# Patient Record
Sex: FEMALE | Race: BLACK OR AFRICAN AMERICAN | Employment: FULL TIME | URBAN - METROPOLITAN AREA
[De-identification: names, ages, dates, MRNs, and addresses within clinical notes are randomized per-mention and may not be internally consistent; named-entity substitution may affect disease eponyms.]

---

## 2021-08-09 ENCOUNTER — APPOINTMENT (OUTPATIENT)
Dept: GENERAL RADIOLOGY | Age: 36
End: 2021-08-09
Payer: COMMERCIAL

## 2021-08-09 ENCOUNTER — APPOINTMENT (OUTPATIENT)
Dept: CT IMAGING | Age: 36
End: 2021-08-09
Payer: COMMERCIAL

## 2021-08-09 ENCOUNTER — HOSPITAL ENCOUNTER (EMERGENCY)
Age: 36
Discharge: HOME | End: 2021-08-09
Attending: EMERGENCY MEDICINE
Payer: COMMERCIAL

## 2021-08-09 VITALS
SYSTOLIC BLOOD PRESSURE: 113 MMHG | OXYGEN SATURATION: 100 % | HEART RATE: 81 BPM | RESPIRATION RATE: 23 BRPM | DIASTOLIC BLOOD PRESSURE: 83 MMHG | TEMPERATURE: 98.2 F

## 2021-08-09 DIAGNOSIS — R07.89 CHEST TIGHTNESS: Primary | ICD-10-CM

## 2021-08-09 DIAGNOSIS — T80.1XXA INTRAVENOUS INFILTRATION, INITIAL ENCOUNTER: ICD-10-CM

## 2021-08-09 LAB
ANION GAP SERPL CALCULATED.3IONS-SCNC: 13 MMOL/L (ref 3–16)
BASOPHILS ABSOLUTE: 0.1 K/UL (ref 0–0.2)
BASOPHILS RELATIVE PERCENT: 0.6 %
BUN BLDV-MCNC: 9 MG/DL (ref 7–20)
CALCIUM SERPL-MCNC: 9.7 MG/DL (ref 8.3–10.6)
CHLORIDE BLD-SCNC: 101 MMOL/L (ref 99–110)
CO2: 23 MMOL/L (ref 21–32)
CREAT SERPL-MCNC: 0.8 MG/DL (ref 0.6–1.1)
EOSINOPHILS ABSOLUTE: 0.4 K/UL (ref 0–0.6)
EOSINOPHILS RELATIVE PERCENT: 4.8 %
GFR AFRICAN AMERICAN: >60
GFR NON-AFRICAN AMERICAN: >60
GLUCOSE BLD-MCNC: 83 MG/DL (ref 70–99)
HCT VFR BLD CALC: 36.1 % (ref 36–48)
HEMOGLOBIN: 12.1 G/DL (ref 12–16)
LYMPHOCYTES ABSOLUTE: 2.9 K/UL (ref 1–5.1)
LYMPHOCYTES RELATIVE PERCENT: 34.7 %
MCH RBC QN AUTO: 25.7 PG (ref 26–34)
MCHC RBC AUTO-ENTMCNC: 33.5 G/DL (ref 31–36)
MCV RBC AUTO: 76.7 FL (ref 80–100)
MONOCYTES ABSOLUTE: 0.8 K/UL (ref 0–1.3)
MONOCYTES RELATIVE PERCENT: 9.5 %
NEUTROPHILS ABSOLUTE: 4.2 K/UL (ref 1.7–7.7)
NEUTROPHILS RELATIVE PERCENT: 50.4 %
PDW BLD-RTO: 16.1 % (ref 12.4–15.4)
PLATELET # BLD: 376 K/UL (ref 135–450)
PMV BLD AUTO: 8.3 FL (ref 5–10.5)
POTASSIUM REFLEX MAGNESIUM: 4.5 MMOL/L (ref 3.5–5.1)
PRO-BNP: 57 PG/ML (ref 0–124)
RBC # BLD: 4.71 M/UL (ref 4–5.2)
SODIUM BLD-SCNC: 137 MMOL/L (ref 136–145)
TROPONIN: <0.01 NG/ML
TROPONIN: <0.01 NG/ML
WBC # BLD: 8.3 K/UL (ref 4–11)

## 2021-08-09 PROCEDURE — 83880 ASSAY OF NATRIURETIC PEPTIDE: CPT

## 2021-08-09 PROCEDURE — 84484 ASSAY OF TROPONIN QUANT: CPT

## 2021-08-09 PROCEDURE — 71046 X-RAY EXAM CHEST 2 VIEWS: CPT

## 2021-08-09 PROCEDURE — 6370000000 HC RX 637 (ALT 250 FOR IP): Performed by: EMERGENCY MEDICINE

## 2021-08-09 PROCEDURE — 80048 BASIC METABOLIC PNL TOTAL CA: CPT

## 2021-08-09 PROCEDURE — 85025 COMPLETE CBC W/AUTO DIFF WBC: CPT

## 2021-08-09 PROCEDURE — 71275 CT ANGIOGRAPHY CHEST: CPT

## 2021-08-09 PROCEDURE — 99284 EMERGENCY DEPT VISIT MOD MDM: CPT

## 2021-08-09 PROCEDURE — 6360000004 HC RX CONTRAST MEDICATION: Performed by: EMERGENCY MEDICINE

## 2021-08-09 PROCEDURE — 36415 COLL VENOUS BLD VENIPUNCTURE: CPT

## 2021-08-09 PROCEDURE — 73110 X-RAY EXAM OF WRIST: CPT

## 2021-08-09 RX ORDER — ASPIRIN 325 MG
325 TABLET ORAL ONCE
Status: COMPLETED | OUTPATIENT
Start: 2021-08-09 | End: 2021-08-09

## 2021-08-09 RX ORDER — MORPHINE SULFATE 4 MG/ML
4 INJECTION, SOLUTION INTRAMUSCULAR; INTRAVENOUS
Status: DISCONTINUED | OUTPATIENT
Start: 2021-08-09 | End: 2021-08-09

## 2021-08-09 RX ORDER — VERAPAMIL HYDROCHLORIDE 120 MG/1
120 CAPSULE, EXTENDED RELEASE ORAL DAILY
COMMUNITY
Start: 2021-08-06 | End: 2021-12-01

## 2021-08-09 RX ADMIN — ASPIRIN 325 MG: 325 TABLET ORAL at 06:12

## 2021-08-09 RX ADMIN — ALUMINUM HYDROXIDE, MAGNESIUM HYDROXIDE, AND SIMETHICONE: 200; 200; 20 SUSPENSION ORAL at 06:12

## 2021-08-09 RX ADMIN — IOPAMIDOL 80 ML: 755 INJECTION, SOLUTION INTRAVENOUS at 08:27

## 2021-08-09 ASSESSMENT — PAIN DESCRIPTION - LOCATION
LOCATION: CHEST
LOCATION: CHEST;ARM

## 2021-08-09 ASSESSMENT — PAIN DESCRIPTION - PAIN TYPE
TYPE: ACUTE PAIN
TYPE: ACUTE PAIN

## 2021-08-09 ASSESSMENT — ENCOUNTER SYMPTOMS
WHEEZING: 0
SHORTNESS OF BREATH: 1
DIARRHEA: 0
COUGH: 0
NAUSEA: 0
EYE PAIN: 0
VOMITING: 0
ABDOMINAL PAIN: 0

## 2021-08-09 ASSESSMENT — PAIN SCALES - GENERAL
PAINLEVEL_OUTOF10: 7
PAINLEVEL_OUTOF10: 9

## 2021-08-09 ASSESSMENT — PAIN DESCRIPTION - FREQUENCY: FREQUENCY: CONTINUOUS

## 2021-08-09 ASSESSMENT — PAIN DESCRIPTION - ORIENTATION: ORIENTATION: LEFT

## 2021-08-09 NOTE — ED PROVIDER NOTES
[address] ENCOUNTER          ATTENDING PHYSICIAN NOTE       Date of evaluation: 8/9/2021    ADDENDUM:      Care of this patient was assumed from Dr. Disha Mcconnell. The patient was seen for Chest Pain (started at approximately 0430. Describes as burning with radiation to left arm.) and Anxiety (patient hyperventilating)  . The patient's initial evaluation and plan have been discussed with the prior provider who initially evaluated the patient. Nursing Notes, Past Medical Hx, Past Surgical Hx, Social Hx, Allergies, and Family Hx were all reviewed. Diagnostic Results       RADIOLOGY:  XR WRIST RIGHT (MIN 3 VIEWS)   Final Result      Large amount of extravasated IV contrast and soft tissues of the right hand and forearm      CTA CHEST ABDOMEN PELVIS W CONTRAST   Final Result      Chest CTA      1. Normal thoracic aorta   2. No pulmonary embolism   3. No acute cardiopulmonary disease      Abdomen pelvis CTA   1. Normal abdominal aorta     2. No acute abnormality in the abdomen or    3. Enlarged uterus containing multiple fibroids         XR CHEST (2 VW)   Final Result     Normal chest x-rays.               LABS:   Results for orders placed or performed during the hospital encounter of 08/09/21   CBC Auto Differential   Result Value Ref Range    WBC 8.3 4.0 - 11.0 K/uL    RBC 4.71 4.00 - 5.20 M/uL    Hemoglobin 12.1 12.0 - 16.0 g/dL    Hematocrit 36.1 36.0 - 48.0 %    MCV 76.7 (L) 80.0 - 100.0 fL    MCH 25.7 (L) 26.0 - 34.0 pg    MCHC 33.5 31.0 - 36.0 g/dL    RDW 16.1 (H) 12.4 - 15.4 %    Platelets 039 144 - 936 K/uL    MPV 8.3 5.0 - 10.5 fL    Neutrophils % 50.4 %    Lymphocytes % 34.7 %    Monocytes % 9.5 %    Eosinophils % 4.8 %    Basophils % 0.6 %    Neutrophils Absolute 4.2 1.7 - 7.7 K/uL    Lymphocytes Absolute 2.9 1.0 - 5.1 K/uL    Monocytes Absolute 0.8 0.0 - 1.3 K/uL    Eosinophils Absolute 0.4 0.0 - 0.6 K/uL    Basophils Absolute 0.1 0.0 - 0.2 K/uL   Basic Metabolic Panel w/ Reflex to MG   Result Value Ref Range    Sodium 137 136 - 145 mmol/L    Potassium reflex Magnesium 4.5 3.5 - 5.1 mmol/L    Chloride 101 99 - 110 mmol/L    CO2 23 21 - 32 mmol/L    Anion Gap 13 3 - 16    Glucose 83 70 - 99 mg/dL    BUN 9 7 - 20 mg/dL    CREATININE 0.8 0.6 - 1.1 mg/dL    GFR Non-African American >60 >60    GFR African American >60 >60    Calcium 9.7 8.3 - 10.6 mg/dL   Troponin   Result Value Ref Range    Troponin <0.01 <0.01 ng/mL   Brain Natriuretic Peptide   Result Value Ref Range    Pro-BNP 57 0 - 124 pg/mL   Troponin   Result Value Ref Range    Troponin <0.01 <0.01 ng/mL       RECENT VITALS:  BP: 136/66, Temp: 98.2 °F (36.8 °C), Pulse: 74, Resp: 14, SpO2: 100 %     Procedures       ED Course     The patient was given the following medications:  Orders Placed This Encounter   Medications    DISCONTD: morphine injection 4 mg    aspirin tablet 325 mg    aluminum & magnesium hydroxide-simethicone (MAALOX) 30 mL, lidocaine viscous hcl (XYLOCAINE) 5 mL (GI COCKTAIL)    iopamidol (ISOVUE-370) 76 % injection 80 mL       CONSULTS:  None    MEDICAL DECISION MAKING / ASSESSMENT / Dolph Calvin is a 38 y.o. female who presented to the emergency department in the early morning hours last night with complaints of burning chest pain, with a heavy sensation in the arms, and apparent symmetric bilateral weakness in the upper extremities, with normal strength in the lower extremities. Please see Dr. Gloria Banda initial dictation for details the patient's history, physical examination, and initial emergency department course. The patient's care was given over to me at 0700 hrs. with results of repeat troponin as well as CT aortogram pending. Repeat troponin was also negative. CT aortogram showed no abnormalities of the aorta or large vasculature, and also no abnormalities of the intrathoracic and intra-abdominal contents otherwise.     On reexamination, the patient endorsed improvement to near resolution of her chest tightness, as well as of her bilateral arm weakness, which she stated was no longer present. However, patient was found to have had contrast extravasation into the right wrist, forearm, and hand. She had significant subcutaneous edema, but good range of motion of the hand, normal distal sensation and capillary refill, no signs of compartment syndrome. X-ray of the affected area of the limb was obtained, which did show that the contrast extravasation was into the subcutaneous tissue, and not into a muscular compartment. The patient was given warm compresses and asked to elevate the arm as much as possible. On final reexamination, the subcutaneous edema had extended somewhat further up the forearm, but compartments were still soft, hand still neurovascularly intact distally. I discussed with the patient that we would often observe for at least several hours in the emergency department with warm compresses and elevation to watch for signs or symptoms of compartment syndrome, and would even potentially consider admission for observation. The patient declined admission, however, and felt that she was capable of observing for worsening symptoms. The patient's family member present at the bedside, is a nurse practitioner, and also feels comfortable participating in observation for signs or symptoms of compartment syndrome. These were discussed extensively with the patient and her family member, and they are comfortable with discharge for continued self observation at home. Clinical Impression     1. Chest tightness    2.  Intravenous infiltration, initial encounter        Disposition     PATIENT REFERRED TO:  [facility] Emergency Department  [address] [place] [address]  Go to   If symptoms worsen      DISCHARGE MEDICATIONS:  New Prescriptions    No medications on file       DISPOSITION Decision To Discharge 08/09/2021 11:46:08 AM       Shawn Cordon MD  08/09/21 1200

## 2021-08-09 NOTE — LETTER
[facility] Emergency Department  [place] [id number]  Phone: [phone]  Fax: [phone]               August 10, 2021    Patient: Casa Bragg   YOB: 1985   Date of Visit: 8/9/2021       To Whom It May Concern:    Casa Bragg was seen and treated in our emergency department on 8/9/2021. She may return to work on 08/11/2021.       Sincerely,             Signature:__________________________________

## 2021-08-09 NOTE — ED PROVIDER NOTES
[address] [facility]          ATTENDING PHYSICIAN NOTE       Date of evaluation: 8/9/2021    Chief Complaint     Chest Pain (started at approximately 0430. Describes as burning with radiation to left arm.) and Anxiety (patient hyperventilating)      History of Present Illness     Shawna Tena is a 38 y.o. female who presents with chief complaint of chest pain. The patient states she woke around 4:30 AM with pain that is located on the center of her chest.  She states that it is squeezing in nature and radiating to her left shoulder. States that both of her arms feel very weak and heavy. They also feel as if they are aching. Her pain is 9/10 in severity. No exacerbating or alleviating factors. Associated with mild dyspnea. Felt fine when she went to bed last night. No cough. No lower extremity swelling. No headaches. No lower extremity numbness or weakness. No bowel or bladder incontinence. No abdominal pain, nausea or vomiting. No recent surgery or travel. Review of Systems     Review of Systems   Constitutional: Negative for chills and fever. HENT: Negative for congestion. Eyes: Negative for pain. Respiratory: Positive for shortness of breath. Negative for cough and wheezing. Cardiovascular: Positive for chest pain. Negative for leg swelling. Gastrointestinal: Negative for abdominal pain, diarrhea, nausea and vomiting. Genitourinary: Negative for dysuria. Musculoskeletal: Negative for arthralgias. Skin: Negative for rash. Neurological: Positive for weakness. Negative for headaches. All other systems reviewed and are negative. Past Medical, Surgical, Family, and Social History         Diagnosis Date    Headache      History reviewed. No pertinent surgical history. Her family history is not on file. She reports that she has never smoked. She has never used smokeless tobacco. She reports previous alcohol use.  She reports that she does not use drugs. Medications     Previous Medications    VERAPAMIL (VERELAN) 120 MG EXTENDED RELEASE CAPSULE           Allergies     She has No Known Allergies. Physical Exam     ED Triage Vitals   Enc Vitals Group      BP 08/09/21 0515 (!) 137/92      Pulse 08/09/21 0515 91      Resp 08/09/21 0515 14      Temp 08/09/21 0543 98.2 °F (36.8 °C)      Temp Source 08/09/21 0543 Oral      SpO2 08/09/21 0515 92 %      Weight --       Height --       Head Circumference --       Peak Flow --       Pain Score --       Pain Loc --       Pain Edu? --       Excl. in [address]? --      General:  Non-toxic, tearful, fully cooperative with my exam    HEENT:  NCAT    Neck:  Supple    Pulmonary:   No increased work of breathing; CTAB    Cardiac:  RRR, no murmur, thrill or gallop. Capillary refill <3s. 2+ distal pulses    Abdomen:  Soft, nontender, nondistended; no focal rebound or guarding    Musculoskeletal:  Grossly intact without obvious injury or deformity. There is some slight tenderness to palpation with spasm over the left trapezius muscle    Neuro: Patient is awake alert and oriented x4, her cranial nerves are intact, she exhibits 3/5 strength of the bilateral upper extremities which is symmetric. 5/5 strength of bilateral lower extremities. Sensation is intact to light touch diffusely. Skin: No rashes      Diagnostic Results     EKG   NSR. No acute ischemic changes. RADIOLOGY:  XR CHEST (2 VW)   Final Result     Normal chest x-rays.           CTA CHEST ABDOMEN PELVIS W CONTRAST    (Results Pending)       LABS:   Results for orders placed or performed during the hospital encounter of 08/09/21   CBC Auto Differential   Result Value Ref Range    WBC 8.3 4.0 - 11.0 K/uL    RBC 4.71 4.00 - 5.20 M/uL    Hemoglobin 12.1 12.0 - 16.0 g/dL    Hematocrit 36.1 36.0 - 48.0 %    MCV 76.7 (L) 80.0 - 100.0 fL    MCH 25.7 (L) 26.0 - 34.0 pg    MCHC 33.5 31.0 - 36.0 g/dL    RDW 16.1 (H) 12.4 - 15.4 %    Platelets 396 310 - 817 K/uL    MPV 8.3 5.0 - 10.5 fL    Neutrophils % 50.4 %    Lymphocytes % 34.7 %    Monocytes % 9.5 %    Eosinophils % 4.8 %    Basophils % 0.6 %    Neutrophils Absolute 4.2 1.7 - 7.7 K/uL    Lymphocytes Absolute 2.9 1.0 - 5.1 K/uL    Monocytes Absolute 0.8 0.0 - 1.3 K/uL    Eosinophils Absolute 0.4 0.0 - 0.6 K/uL    Basophils Absolute 0.1 0.0 - 0.2 K/uL   Basic Metabolic Panel w/ Reflex to MG   Result Value Ref Range    Sodium 137 136 - 145 mmol/L    Potassium reflex Magnesium 4.5 3.5 - 5.1 mmol/L    Chloride 101 99 - 110 mmol/L    CO2 23 21 - 32 mmol/L    Anion Gap 13 3 - 16    Glucose 83 70 - 99 mg/dL    BUN 9 7 - 20 mg/dL    CREATININE 0.8 0.6 - 1.1 mg/dL    GFR Non-African American >60 >60    GFR African American >60 >60    Calcium 9.7 8.3 - 10.6 mg/dL   Troponin   Result Value Ref Range    Troponin <0.01 <0.01 ng/mL   Brain Natriuretic Peptide   Result Value Ref Range    Pro-BNP 57 0 - 124 pg/mL       RECENT VITALS:  BP: 123/64, Temp: 98.2 °F (36.8 °C), Pulse: 83, Resp: 18, SpO2: 100 %     Procedures         ED Course     Nursing Notes, Past Medical Hx, Past Surgical Hx, Social Hx, Allergies, and Family Hx were reviewed. The patient was given the following medications:  Orders Placed This Encounter   Medications    DISCONTD: morphine injection 4 mg    aspirin tablet 325 mg    aluminum & magnesium hydroxide-simethicone (MAALOX) 30 mL, lidocaine viscous hcl (XYLOCAINE) 5 mL (GI COCKTAIL)       CONSULTS:  None    MEDICAL DECISION MAKING / ASSESSMENT / Ai Mirella is a 38 y.o. female who presents with chest pain and arm weakness. Her vascular exam is reassuring against acute occlusion or ischemia. EKG without signs of acute ischemic change. Initial troponin is not elevated. Does have some concerning features of her chest pain but declined morphine. Is given a GI cocktail. Difficult to put together chest pain plus the arm weakness.   Will get a CT of the aorta to ensure there is not a vascular source as this is the most reasonable way to connect these 2. Lower suspicion for April Me variant's of Guillain-Barré as that would not explain the chest pain despite the fact that it would explain the arm weakness. Symmetric arm weakness in bilateral upper extremities would suggest against a central cause and certainly would not be suggestive of acute ischemic stroke. Does not have midline neck pain to think of a protruding cervical disc and with the symptoms being isolated the upper extremities lower suspicion for spinal compression. Patient is a turned over to Dr. Adiel Sabillon to follow-up on CT scan and reevaluate patient at that point. Clinical Impression     Chest Pain    Disposition     PATIENT REFERRED TO:  No follow-up provider specified.     DISCHARGE MEDICATIONS:  New Prescriptions    No medications on file       [address], MD  08/09/21 6717

## 2021-08-10 ENCOUNTER — HOSPITAL ENCOUNTER (EMERGENCY)
Age: 36
Discharge: HOME | End: 2021-08-10
Attending: EMERGENCY MEDICINE
Payer: COMMERCIAL

## 2021-08-10 ENCOUNTER — APPOINTMENT (OUTPATIENT)
Dept: GENERAL RADIOLOGY | Age: 36
End: 2021-08-10
Payer: COMMERCIAL

## 2021-08-10 VITALS
OXYGEN SATURATION: 100 % | DIASTOLIC BLOOD PRESSURE: 80 MMHG | SYSTOLIC BLOOD PRESSURE: 128 MMHG | TEMPERATURE: 98.6 F | RESPIRATION RATE: 16 BRPM | HEART RATE: 82 BPM

## 2021-08-10 DIAGNOSIS — M79.89 SWELLING OF RIGHT UPPER EXTREMITY: Primary | ICD-10-CM

## 2021-08-10 LAB
ANION GAP SERPL CALCULATED.3IONS-SCNC: 10 MMOL/L (ref 3–16)
BASOPHILS ABSOLUTE: 0.1 K/UL (ref 0–0.2)
BASOPHILS RELATIVE PERCENT: 0.8 %
BUN BLDV-MCNC: 10 MG/DL (ref 7–20)
CALCIUM SERPL-MCNC: 9.5 MG/DL (ref 8.3–10.6)
CHLORIDE BLD-SCNC: 97 MMOL/L (ref 99–110)
CO2: 28 MMOL/L (ref 21–32)
CREAT SERPL-MCNC: 0.9 MG/DL (ref 0.6–1.1)
EOSINOPHILS ABSOLUTE: 0.4 K/UL (ref 0–0.6)
EOSINOPHILS RELATIVE PERCENT: 4.9 %
GFR AFRICAN AMERICAN: >60
GFR NON-AFRICAN AMERICAN: >60
GLUCOSE BLD-MCNC: 97 MG/DL (ref 70–99)
HCT VFR BLD CALC: 34 % (ref 36–48)
HEMOGLOBIN: 11.5 G/DL (ref 12–16)
LYMPHOCYTES ABSOLUTE: 2.4 K/UL (ref 1–5.1)
LYMPHOCYTES RELATIVE PERCENT: 28.4 %
MCH RBC QN AUTO: 26.1 PG (ref 26–34)
MCHC RBC AUTO-ENTMCNC: 33.8 G/DL (ref 31–36)
MCV RBC AUTO: 77.3 FL (ref 80–100)
MONOCYTES ABSOLUTE: 0.7 K/UL (ref 0–1.3)
MONOCYTES RELATIVE PERCENT: 8.5 %
NEUTROPHILS ABSOLUTE: 4.8 K/UL (ref 1.7–7.7)
NEUTROPHILS RELATIVE PERCENT: 57.4 %
PDW BLD-RTO: 15.8 % (ref 12.4–15.4)
PLATELET # BLD: 330 K/UL (ref 135–450)
PMV BLD AUTO: 8 FL (ref 5–10.5)
POTASSIUM REFLEX MAGNESIUM: 3.6 MMOL/L (ref 3.5–5.1)
RBC # BLD: 4.4 M/UL (ref 4–5.2)
SODIUM BLD-SCNC: 135 MMOL/L (ref 136–145)
WBC # BLD: 8.4 K/UL (ref 4–11)

## 2021-08-10 PROCEDURE — 36415 COLL VENOUS BLD VENIPUNCTURE: CPT

## 2021-08-10 PROCEDURE — 85025 COMPLETE CBC W/AUTO DIFF WBC: CPT

## 2021-08-10 PROCEDURE — 73090 X-RAY EXAM OF FOREARM: CPT

## 2021-08-10 PROCEDURE — 80048 BASIC METABOLIC PNL TOTAL CA: CPT

## 2021-08-10 PROCEDURE — 99283 EMERGENCY DEPT VISIT LOW MDM: CPT

## 2021-08-10 RX ORDER — DOXYCYCLINE 100 MG/1
100 TABLET ORAL 2 TIMES DAILY
Qty: 20 TABLET | Refills: 0 | Status: SHIPPED | OUTPATIENT
Start: 2021-08-10 | End: 2021-08-16

## 2021-08-10 ASSESSMENT — PAIN DESCRIPTION - LOCATION: LOCATION: HAND

## 2021-08-10 ASSESSMENT — PAIN DESCRIPTION - ORIENTATION: ORIENTATION: RIGHT

## 2021-08-10 ASSESSMENT — PAIN SCALES - GENERAL: PAINLEVEL_OUTOF10: 5

## 2021-08-10 ASSESSMENT — PAIN DESCRIPTION - PAIN TYPE: TYPE: ACUTE PAIN

## 2021-08-11 ASSESSMENT — ENCOUNTER SYMPTOMS: RESPIRATORY NEGATIVE: 1

## 2021-08-11 NOTE — ED PROVIDER NOTES
ED Attending Attestation Note     Date of evaluation: 8/10/2021    This patient was seen by the [place] practice provider. I have seen and examined the patient, agree with the workup, evaluation, management and diagnosis. The care plan has been discussed. My assessment reveals a well-appearing young woman, pleasantly conversational, in no acute distress. She was discharged from this emergency department about 36 hours ago, after a work-up for chest pain, which was complicated by CT contrast extravasation into the right forearm. At the time of discharge, she had soft compartments, although diffuse edema of the hand, wrist, and forearm, with no signs or symptoms of compartment syndrome, and wished to elevate and monitor at home. The patient presents that the edema in her hand and forearm was much better when she woke up this morning after elevating overnight, but in the last several hours it has begun to worsen again. On exam, she does have some nonpitting edema diffusely throughout the hand, wrist, and forearm, essentially to the elbow. This is improved from her exam yesterday morning, and with less induration. She has good range of motion, normal capillary refill, normal sensation and motor function. I discussed with the patient that the most likely explanation for the progression of her symptoms is that the edema had improved with elevation, and then began to reaccumulate as she was using her arm and gravity acted upon it over the course of the day. However I discussed with her that there is a very faint possibility that her symptoms could be relating to an early, developing cellulitis associated with the infiltration. I discussed with the patient that she should elevate again overnight, and if the edema is again improved in the morning, then infection is not the source of her symptoms.   However if she does not note improvement in the morning, or if she should have worsening, then she will have a prescription for antibiotics which she should fill in that case, so that she would not need to return to the emergency department at that time. The patient and her family member, present at bedside, were comfortable with this plan.          Tegan Ann MD  08/10/21 9490

## 2021-08-16 ENCOUNTER — OFFICE VISIT (OUTPATIENT)
Dept: INTERNAL MEDICINE CLINIC | Age: 36
End: 2021-08-16
Payer: COMMERCIAL

## 2021-08-16 VITALS
WEIGHT: 183 LBS | RESPIRATION RATE: 16 BRPM | OXYGEN SATURATION: 99 % | HEART RATE: 94 BPM | DIASTOLIC BLOOD PRESSURE: 76 MMHG | SYSTOLIC BLOOD PRESSURE: 132 MMHG

## 2021-08-16 DIAGNOSIS — Q45.3 PANCREATIC DIVISUM: ICD-10-CM

## 2021-08-16 DIAGNOSIS — R00.2 PALPITATIONS: ICD-10-CM

## 2021-08-16 DIAGNOSIS — R07.9 CHEST PAIN, UNSPECIFIED TYPE: Primary | ICD-10-CM

## 2021-08-16 DIAGNOSIS — Z00.00 ENCOUNTER FOR PREVENTATIVE ADULT HEALTH CARE EXAMINATION: ICD-10-CM

## 2021-08-16 DIAGNOSIS — G43.409 HEMIPLEGIC MIGRAINE WITHOUT STATUS MIGRAINOSUS, NOT INTRACTABLE: ICD-10-CM

## 2021-08-16 DIAGNOSIS — Z11.59 ENCOUNTER FOR HEPATITIS C SCREENING TEST FOR LOW RISK PATIENT: ICD-10-CM

## 2021-08-16 DIAGNOSIS — Z12.31 ENCOUNTER FOR SCREENING MAMMOGRAM FOR BREAST CANCER: ICD-10-CM

## 2021-08-16 LAB
BILIRUBIN URINE: NEGATIVE
BLOOD, URINE: NEGATIVE
CLARITY: CLEAR
COLOR: YELLOW
EPITHELIAL CELLS, UA: 3 /HPF (ref 0–5)
GLUCOSE URINE: NEGATIVE MG/DL
HYALINE CASTS: 0 /LPF (ref 0–8)
KETONES, URINE: NEGATIVE MG/DL
LEUKOCYTE ESTERASE, URINE: ABNORMAL
MICROSCOPIC EXAMINATION: YES
NITRITE, URINE: NEGATIVE
PH UA: 7 (ref 5–8)
PROTEIN UA: NEGATIVE MG/DL
RBC UA: 1 /HPF (ref 0–4)
SPECIFIC GRAVITY UA: 1.01 (ref 1–1.03)
URINE TYPE: ABNORMAL
UROBILINOGEN, URINE: 0.2 E.U./DL
WBC UA: 3 /HPF (ref 0–5)

## 2021-08-16 PROCEDURE — 99385 PREV VISIT NEW AGE 18-39: CPT | Performed by: INTERNAL MEDICINE

## 2021-08-16 PROCEDURE — 81003 URINALYSIS AUTO W/O SCOPE: CPT | Performed by: INTERNAL MEDICINE

## 2021-08-16 RX ORDER — DICYCLOMINE HCL 20 MG
TABLET ORAL
COMMUNITY
End: 2021-10-13 | Stop reason: CLARIF

## 2021-08-16 RX ORDER — TRIAMTERENE AND HYDROCHLOROTHIAZIDE 37.5; 25 MG/1; MG/1
TABLET ORAL
COMMUNITY
End: 2021-09-04

## 2021-08-16 SDOH — ECONOMIC STABILITY: FOOD INSECURITY: WITHIN THE PAST 12 MONTHS, THE FOOD YOU BOUGHT JUST DIDN'T LAST AND YOU DIDN'T HAVE MONEY TO GET MORE.: NEVER TRUE

## 2021-08-16 SDOH — ECONOMIC STABILITY: FOOD INSECURITY: WITHIN THE PAST 12 MONTHS, YOU WORRIED THAT YOUR FOOD WOULD RUN OUT BEFORE YOU GOT MONEY TO BUY MORE.: NEVER TRUE

## 2021-08-16 ASSESSMENT — ENCOUNTER SYMPTOMS
ABDOMINAL PAIN: 0
TROUBLE SWALLOWING: 0
BLOOD IN STOOL: 0
CONSTIPATION: 0
DIARRHEA: 0
FACIAL SWELLING: 0
SORE THROAT: 0
EYES NEGATIVE: 1
COUGH: 0
ALLERGIC/IMMUNOLOGIC NEGATIVE: 1
SINUS PAIN: 0
SINUS PRESSURE: 0
ANAL BLEEDING: 0
VOICE CHANGE: 0
NAUSEA: 0
VOMITING: 0
ABDOMINAL DISTENTION: 0
SHORTNESS OF BREATH: 0

## 2021-08-16 ASSESSMENT — PATIENT HEALTH QUESTIONNAIRE - PHQ9
SUM OF ALL RESPONSES TO PHQ QUESTIONS 1-9: 0
SUM OF ALL RESPONSES TO PHQ9 QUESTIONS 1 & 2: 0
2. FEELING DOWN, DEPRESSED OR HOPELESS: 0
2. FEELING DOWN, DEPRESSED OR HOPELESS: 0
SUM OF ALL RESPONSES TO PHQ QUESTIONS 1-9: 0
SUM OF ALL RESPONSES TO PHQ9 QUESTIONS 1 & 2: 0
SUM OF ALL RESPONSES TO PHQ QUESTIONS 1-9: 0
1. LITTLE INTEREST OR PLEASURE IN DOING THINGS: 0
SUM OF ALL RESPONSES TO PHQ QUESTIONS 1-9: 0
1. LITTLE INTEREST OR PLEASURE IN DOING THINGS: 0

## 2021-08-16 ASSESSMENT — SOCIAL DETERMINANTS OF HEALTH (SDOH): HOW HARD IS IT FOR YOU TO PAY FOR THE VERY BASICS LIKE FOOD, HOUSING, MEDICAL CARE, AND HEATING?: NOT HARD AT ALL

## 2021-08-16 NOTE — ASSESSMENT & PLAN NOTE
Uncontrolled, continue current medications, lifestyle modifications recommended and Will do cholesterol screening. Recent emergency room visit showed normal CTA of the chest and normal troponins. With patient's history of hemiplegic migraine concern for vasospasm which to verapamil would partially treat. We will get a stress test to further evaluate and evaluate lipid profile for risk assessment. No myalgias or arthralgias to indicate lupus but possibility of a rheumatologic disease linking the migraines and chest pain.

## 2021-08-16 NOTE — PROGRESS NOTES
Sudha Ortiz (:  1985) is a 38 y.o. female,New patient, here for evaluation of the following chief complaint(s): For the chest pain and a preventive evaluation  New Patient (new to office)         ASSESSMENT/PLAN:  1. Chest pain, unspecified type  Assessment & Plan:   Uncontrolled, continue current medications, lifestyle modifications recommended and Will do cholesterol screening. Recent emergency room visit showed normal CTA of the chest and normal troponins. With patient's history of hemiplegic migraine concern for vasospasm which to verapamil would partially treat. We will get a stress test to further evaluate and evaluate lipid profile for risk assessment. No myalgias or arthralgias to indicate lupus but possibility of a rheumatologic disease linking the migraines and chest pain. Orders:  -     NM MYOCARDIAL SPECT REST EXERCISE OR RX; Future  -     LONGTERM CONTINUOUS CARDIAC EVENT MONITOR (ZIO); Future  2. Hemiplegic migraine without status migrainosus, not intractable  Assessment & Plan:   Well-controlled, continue current medications  3. Encounter for screening mammogram for breast cancer  Assessment & Plan:   Unclear control, Inpatient but she is low risk. Orders:  -     RAFY HAILEE DIGITAL SCREEN BILATERAL; Future  4. Encounter for preventative adult health care examination  Assessment & Plan:   Unclear control, Inpatient but she is low risk. Orders:  -     Urinalysis  -     CBC WITH AUTO DIFFERENTIAL; Future  -     COMPREHENSIVE METABOLIC PANEL; Future  -     HEMOGLOBIN A1C; Future  -     LIPID PANEL; Future  -     TSH WITH REFLEX TO FT4; Future  -     VITAMIN D 25 HYDROXY; Future  5. Pancreatic divisum  Assessment & Plan:   Well-controlled, Since gallbladder removal and dilatation of the pancreatic ducts has been under control since around . We will get a lipase level and if elevated will refer for GI consultation. Orders:  -     LIPASE; Future  6.  Encounter for hepatitis C screening test for low risk patient  Assessment & Plan:   Unclear control, Inpatient but she is low risk. Orders:  -     HEPATITIS C ANTIBODY; Future  7. Palpitations  Assessment & Plan:   Uncontrolled, continue current medications and We will have patient get a ZIO monitor since palpitations happen daily and check CBC and thyroid function to further evaluate. We will have patient keep a ZIO monitor for 2 weeks. Orders:  -     LONGTERM CONTINUOUS CARDIAC EVENT MONITOR (ZIO); Future      Return in about 4 weeks (around 9/13/2021). Subjective   SUBJECTIVE/OBJECTIVE:  Chest Pain   This is a new problem. The current episode started more than 1 year ago. The onset quality is sudden (awakens from sleep). The problem occurs intermittently. The problem has been waxing and waning. The pain is present in the substernal region. The pain is at a severity of 10/10. The pain is severe. The quality of the pain is described as sharp. The pain radiates to the left shoulder, right shoulder, left neck, right arm, left arm and upper back. Associated symptoms include irregular heartbeat and palpitations. Pertinent negatives include no abdominal pain, claudication, cough, diaphoresis, nausea, shortness of breath or vomiting. She has tried nothing for the symptoms. The treatment provided significant relief. Pertinent negatives for past medical history include no congenital heart disease, no connective tissue disease and no hypertension. Impression       Chest CTA       1. Normal thoracic aorta   2. No pulmonary embolism   3. No acute cardiopulmonary disease       Abdomen pelvis CTA   1. Normal abdominal aorta     2. No acute abnormality in the abdomen or    3.  Enlarged uterus conta     Family History   Problem Relation Age of Onset    Hypertension Mother     Migraines Mother    [facility] Stroke Father     Hypertension Father     Diabetes Father     Other Sister         pancreatitis     Social History     Social History Narrative    Not on file   Patient does not smoke or drink and is single. Current Outpatient Medications on File Prior to Visit   Medication Sig Dispense Refill    triamterene-hydroCHLOROthiazide (MAXZIDE-25) 37.5-25 MG per tablet triamterene 37.5 mg-hydrochlorothiazide 25 mg tablet   TAKE 1 TABLET BY MOUTH EVERY DAY      dicyclomine (BENTYL) 20 MG tablet dicyclomine 20 mg tablet   TAKE 1 TABLETS BY MOUTH 4 TIMES A DAY AS NEEDED FOR IRRITABLE BOWEL SYNDROME      verapamil (VERELAN) 120 MG extended release capsule        No current facility-administered medications on file prior to visit. Review of Systems   Constitutional: Negative. Negative for diaphoresis. HENT: Positive for hearing loss. Negative for congestion, dental problem, drooling, ear discharge, ear pain, facial swelling, nosebleeds, sinus pressure, sinus pain, sore throat, trouble swallowing and voice change. Hearing loss right ear. Eyes: Negative. Eye exam 1.5 years ago. Respiratory: Negative for cough and shortness of breath. Cardiovascular: Positive for chest pain and palpitations. Negative for claudication. Gastrointestinal: Negative for abdominal distention, abdominal pain, anal bleeding, blood in stool, constipation, diarrhea, nausea and vomiting. Pancreatic divisum  Past bout of severe pancreatitis, prior to gall bladder removal.   Endocrine: Negative. Negative for cold intolerance, heat intolerance, polydipsia, polyphagia and polyuria. Genitourinary:        Maternal great aunt with breast cancer. Menarche age 13.   Uterine fibroid. Has pap appointment 21   Musculoskeletal: Negative. Allergic/Immunologic: Negative. Neurological:        Hemiplegic migraine   Hematological: Negative. Psychiatric/Behavioral: Negative. Objective   Physical Exam  Constitutional:       General: She is not in acute distress. Appearance: Normal appearance. She is normal weight.    HENT:      Head:

## 2021-08-16 NOTE — LETTER
[place] [address]  Phone: [phone]  Fax: [phone]    Osbaldo Macario MD        August 16, 2021     Patient: Abdiaziz Somers   YOB: 1985   Date of Visit: 8/16/2021       To Whom It May Concern:     Abdiaziz Somers was evaluated in the office today. .    If you have any questions or concerns, please don't hesitate to call.     Sincerely,          Osbaldo Macario MD

## 2021-08-18 ENCOUNTER — HOSPITAL ENCOUNTER (EMERGENCY)
Age: 36
Discharge: HOME | End: 2021-08-18
Attending: EMERGENCY MEDICINE
Payer: COMMERCIAL

## 2021-08-18 ENCOUNTER — APPOINTMENT (OUTPATIENT)
Dept: GENERAL RADIOLOGY | Age: 36
End: 2021-08-18
Payer: COMMERCIAL

## 2021-08-18 ENCOUNTER — NURSE TRIAGE (OUTPATIENT)
Dept: OTHER | Facility: CLINIC | Age: 36
End: 2021-08-18

## 2021-08-18 VITALS
WEIGHT: 180 LBS | DIASTOLIC BLOOD PRESSURE: 75 MMHG | SYSTOLIC BLOOD PRESSURE: 112 MMHG | OXYGEN SATURATION: 100 % | RESPIRATION RATE: 16 BRPM | HEIGHT: 64 IN | BODY MASS INDEX: 30.73 KG/M2 | HEART RATE: 93 BPM | TEMPERATURE: 97.3 F

## 2021-08-18 DIAGNOSIS — R07.89 CHEST TIGHTNESS: Primary | ICD-10-CM

## 2021-08-18 DIAGNOSIS — R06.02 SHORTNESS OF BREATH: Primary | ICD-10-CM

## 2021-08-18 LAB
ALBUMIN SERPL-MCNC: 4 G/DL (ref 3.4–5)
ALP BLD-CCNC: 72 U/L (ref 40–129)
ALT SERPL-CCNC: 12 U/L (ref 10–40)
ANION GAP SERPL CALCULATED.3IONS-SCNC: 13 MMOL/L (ref 3–16)
AST SERPL-CCNC: 20 U/L (ref 15–37)
BASOPHILS ABSOLUTE: 0 K/UL (ref 0–0.2)
BASOPHILS RELATIVE PERCENT: 0.6 %
BILIRUB SERPL-MCNC: <0.2 MG/DL (ref 0–1)
BILIRUBIN DIRECT: <0.2 MG/DL (ref 0–0.3)
BILIRUBIN, INDIRECT: NORMAL MG/DL (ref 0–1)
BUN BLDV-MCNC: 10 MG/DL (ref 7–20)
CALCIUM SERPL-MCNC: 9.6 MG/DL (ref 8.3–10.6)
CHLORIDE BLD-SCNC: 99 MMOL/L (ref 99–110)
CO2: 23 MMOL/L (ref 21–32)
CREAT SERPL-MCNC: 0.7 MG/DL (ref 0.6–1.1)
EKG ATRIAL RATE: 91 BPM
EKG DIAGNOSIS: NORMAL
EKG P AXIS: 39 DEGREES
EKG P-R INTERVAL: 148 MS
EKG Q-T INTERVAL: 350 MS
EKG QRS DURATION: 76 MS
EKG QTC CALCULATION (BAZETT): 430 MS
EKG R AXIS: 18 DEGREES
EKG T AXIS: 17 DEGREES
EKG VENTRICULAR RATE: 91 BPM
EOSINOPHILS ABSOLUTE: 0.4 K/UL (ref 0–0.6)
EOSINOPHILS RELATIVE PERCENT: 5.1 %
GFR AFRICAN AMERICAN: >60
GFR NON-AFRICAN AMERICAN: >60
GLUCOSE BLD-MCNC: 105 MG/DL (ref 70–99)
HCT VFR BLD CALC: 33.9 % (ref 36–48)
HEMOGLOBIN: 11.3 G/DL (ref 12–16)
LYMPHOCYTES ABSOLUTE: 1.9 K/UL (ref 1–5.1)
LYMPHOCYTES RELATIVE PERCENT: 26.4 %
MCH RBC QN AUTO: 25.3 PG (ref 26–34)
MCHC RBC AUTO-ENTMCNC: 33.4 G/DL (ref 31–36)
MCV RBC AUTO: 75.8 FL (ref 80–100)
MONOCYTES ABSOLUTE: 0.7 K/UL (ref 0–1.3)
MONOCYTES RELATIVE PERCENT: 9.4 %
NEUTROPHILS ABSOLUTE: 4.3 K/UL (ref 1.7–7.7)
NEUTROPHILS RELATIVE PERCENT: 58.5 %
PDW BLD-RTO: 16.2 % (ref 12.4–15.4)
PLATELET # BLD: 336 K/UL (ref 135–450)
PMV BLD AUTO: 8.3 FL (ref 5–10.5)
POTASSIUM REFLEX MAGNESIUM: 3.7 MMOL/L (ref 3.5–5.1)
RBC # BLD: 4.48 M/UL (ref 4–5.2)
SEDIMENTATION RATE, ERYTHROCYTE: 40 MM/HR (ref 0–20)
SODIUM BLD-SCNC: 135 MMOL/L (ref 136–145)
TOTAL PROTEIN: 7.4 G/DL (ref 6.4–8.2)
TROPONIN: <0.01 NG/ML
TROPONIN: <0.01 NG/ML
WBC # BLD: 7.3 K/UL (ref 4–11)

## 2021-08-18 PROCEDURE — 85025 COMPLETE CBC W/AUTO DIFF WBC: CPT

## 2021-08-18 PROCEDURE — 85652 RBC SED RATE AUTOMATED: CPT

## 2021-08-18 PROCEDURE — 93005 ELECTROCARDIOGRAM TRACING: CPT | Performed by: EMERGENCY MEDICINE

## 2021-08-18 PROCEDURE — 84484 ASSAY OF TROPONIN QUANT: CPT

## 2021-08-18 PROCEDURE — 80076 HEPATIC FUNCTION PANEL: CPT

## 2021-08-18 PROCEDURE — 71045 X-RAY EXAM CHEST 1 VIEW: CPT

## 2021-08-18 PROCEDURE — 94640 AIRWAY INHALATION TREATMENT: CPT

## 2021-08-18 PROCEDURE — 36415 COLL VENOUS BLD VENIPUNCTURE: CPT

## 2021-08-18 PROCEDURE — 80048 BASIC METABOLIC PNL TOTAL CA: CPT

## 2021-08-18 PROCEDURE — 99284 EMERGENCY DEPT VISIT MOD MDM: CPT

## 2021-08-18 PROCEDURE — 6370000000 HC RX 637 (ALT 250 FOR IP): Performed by: PHYSICIAN ASSISTANT

## 2021-08-18 RX ORDER — ALBUTEROL SULFATE 90 UG/1
2 AEROSOL, METERED RESPIRATORY (INHALATION) EVERY 4 HOURS PRN
Qty: 1 INHALER | Refills: 0 | Status: SHIPPED | OUTPATIENT
Start: 2021-08-18 | End: 2021-08-30 | Stop reason: SDUPTHER

## 2021-08-18 RX ORDER — IPRATROPIUM BROMIDE AND ALBUTEROL SULFATE 2.5; .5 MG/3ML; MG/3ML
1 SOLUTION RESPIRATORY (INHALATION) ONCE
Status: COMPLETED | OUTPATIENT
Start: 2021-08-18 | End: 2021-08-18

## 2021-08-18 RX ADMIN — IPRATROPIUM BROMIDE AND ALBUTEROL SULFATE 1 AMPULE: .5; 3 SOLUTION RESPIRATORY (INHALATION) at 12:47

## 2021-08-18 ASSESSMENT — ENCOUNTER SYMPTOMS
WHEEZING: 0
ABDOMINAL PAIN: 0
SHORTNESS OF BREATH: 1
SORE THROAT: 0
NAUSEA: 0
DIARRHEA: 0
CHEST TIGHTNESS: 1
VOMITING: 0
COUGH: 0
CONSTIPATION: 0
RHINORRHEA: 0

## 2021-08-18 ASSESSMENT — PAIN DESCRIPTION - DIRECTION: RADIATING_TOWARDS: BOTH SHOULDERS

## 2021-08-18 ASSESSMENT — PAIN SCALES - GENERAL: PAINLEVEL_OUTOF10: 7

## 2021-08-18 ASSESSMENT — PAIN DESCRIPTION - LOCATION: LOCATION: CHEST

## 2021-08-18 ASSESSMENT — PULMONARY FUNCTION TESTS: PEFR_L/MIN: 15

## 2021-08-18 NOTE — ED PROVIDER NOTES
ED Attending Attestation Note     Date of evaluation: 8/18/2021    This patient was seen by the advance practice provider. I have seen and examined the patient, agree with the workup, evaluation, management and diagnosis. The care plan has been discussed. I have reviewed the ECG and concur with the ALFREDITO's interpretation. My assessment reveals patient chest pain beginning at work that started this am.  Was scheduled for outpatient stress but has not done yet. Nebulizer given for SOB which improved. No wheezing on repeat exam.  Do not suspect a cardiac cause and overall has no risk factors and think she can follow-up as an outpatient to get outpatient stress test as planned. Will start on an albuterol inhaler.      Markos Tavera MD  08/18/21 9393

## 2021-08-18 NOTE — ED NOTES
Discharge Summary  Date:8/18/2021        Patient Pasha Quezada     YOB: 1985     Age:35 y.o. Admit Date:8/18/2021   Admission Condition:good   Discharged Condition:good  Discharge Date: 08/18/21     Discharge Diagnoses   Chest pain, shortness of breath  Hospital Stay   Narrative of Hospital Course:   Consultants:   [facility] PROVIDER    Time Spent on Discharge:  5 minutes were spent in patient examination, evaluation, counseling as well as medication reconciliation, prescriptions for required medications, discharge plan and follow up. Surgeries/Procedures Performed:     Radiology Last 7 Days:  XR CHEST PORTABLE    Result Date: 8/18/2021  1. No evidence of acute cardiopulmonary disease. Discharge Plan   Disposition: Home    Provider Follow-Up:   Marly Hsu, [address] [facility]  [phone]    Schedule an appointment as soon as possible for a visit          Hospital/Incidental Findings Requiring Follow-Up:      Patient Instructions       Discharge Medications         Medication List      START taking these medications    albuterol sulfate  (90 Base) MCG/ACT inhaler  Commonly known as: Proventil HFA  Inhale 2 puffs into the lungs every 4 hours as needed for Wheezing or Shortness of Breath (Space out to every 6 hours as symptoms improve) Space out to every 6 hours as symptoms improve.         ASK your doctor about these medications    dicyclomine 20 MG tablet  Commonly known as: BENTYL     triamterene-hydroCHLOROthiazide 37.5-25 MG per tablet  Commonly known as: MAXZIDE-25     verapamil 120 MG extended release capsule  Commonly known as: Sheryl Beavers           Where to Get Your Medications      You can get these medications from any pharmacy    Bring a paper prescription for each of these medications  · albuterol sulfate  (90 Base) MCG/ACT inhaler         Electronically signed by Abner Reilly RN on 8/18/21 at 2:09 PM EDT Bari Riggs, [facility]  08/18/21 9057

## 2021-08-18 NOTE — ED PROVIDER NOTES
[facility]          PHYSICIAN ASSISTANT NOTE       Date of evaluation: 8/18/2021    Chief Complaint     Chest Pain and Shortness of Breath      History of Present Illness     HPI: Radha Mccollum is a 38 y.o. female with history of headaches and pancreatitis who presents to the emergency department with chest pain and SOB. Patient states she was sitting at work this morning when she began feeling chest tightness radiating into her shoulders. She also had an associated sensation of not being able to take a deep breath. She notes that the symptoms are similar to her previous episodes that led her to come to the emergency department on 8/9. She most recently saw her PCP 2 days ago who ordered an outpatient stress test for similar events. Patient denies any associated recent travel, hospitalizations or leg swelling. She has a history of childhood asthma, has not had an issue with this since she was a child. With the exception of the above, there are no aggravating or alleviating factors. Review of Systems     Review of Systems   Constitutional: Negative for chills, diaphoresis, fatigue and fever. HENT: Negative for congestion, rhinorrhea and sore throat. Respiratory: Positive for chest tightness and shortness of breath. Negative for cough and wheezing. Cardiovascular: Positive for chest pain (\"chest tightness\"). Negative for leg swelling. Gastrointestinal: Negative for abdominal pain, constipation, diarrhea, nausea and vomiting. Musculoskeletal: Negative for myalgias and neck pain. Neurological: Positive for dizziness. Negative for syncope, weakness, light-headedness, numbness and headaches. Psychiatric/Behavioral: The patient is not nervous/anxious. As stated above, all other systems reviewed and are otherwise negative.      Past Medical, Surgical, Family, and Social History     She has a past medical history of Asthma, Headache, Hypertension, and Pancreatitis. She has a past surgical history that includes ERCP (N/A) and Cholecystectomy, laparoscopic. Her family history includes Diabetes in her father; Hypertension in her father and mother; Migraines in her mother; Other in her sister; Stroke in her father. She reports that she has never smoked. She has never used smokeless tobacco. She reports previous alcohol use. She reports that she does not use drugs. Medications     Discharge Medication List as of 8/18/2021  1:51 PM      CONTINUE these medications which have NOT CHANGED    Details   triamterene-hydroCHLOROthiazide (MAXZIDE-25) 37.5-25 MG per tablet triamterene 37.5 mg-hydrochlorothiazide 25 mg tablet   TAKE 1 TABLET BY MOUTH EVERY DAYHistorical Med      dicyclomine (BENTYL) 20 MG tablet dicyclomine 20 mg tablet   TAKE 1 TABLETS BY MOUTH 4 TIMES A DAY AS NEEDED FOR IRRITABLE BOWEL SYNDROMEHistorical Med      verapamil (VERELAN) 120 MG extended release capsule Take 120 mg by mouth daily Historical Med             Allergies     She is allergic to other, sumatriptan, guaifenesin, morphine, and oxycodone-acetaminophen. Physical Exam     INITIAL VITALS: BP: 131/81, Temp: 97.3 °F (36.3 °C), Pulse: 95, Resp: 18, SpO2: 100 %  Physical Exam  Vitals and nursing note reviewed. Constitutional:       General: She is not in acute distress. Appearance: She is well-developed. She is not ill-appearing or toxic-appearing. HENT:      Head: Normocephalic and atraumatic. Right Ear: External ear normal.      Left Ear: External ear normal.      Nose: Nose normal.      Mouth/Throat:      Mouth: Mucous membranes are moist.      Pharynx: Oropharynx is clear. Eyes:      Extraocular Movements: Extraocular movements intact. Conjunctiva/sclera: Conjunctivae normal.   Cardiovascular:      Rate and Rhythm: Normal rate and regular rhythm. Heart sounds: No murmur heard. No friction rub. No gallop.     Pulmonary:      Effort: Pulmonary effort is K/uL    Eosinophils Absolute 0.4 0.0 - 0.6 K/uL    Basophils Absolute 0.0 0.0 - 0.2 K/uL   Basic Metabolic Panel w/ Reflex to MG   Result Value Ref Range    Sodium 135 (L) 136 - 145 mmol/L    Potassium reflex Magnesium 3.7 3.5 - 5.1 mmol/L    Chloride 99 99 - 110 mmol/L    CO2 23 21 - 32 mmol/L    Anion Gap 13 3 - 16    Glucose 105 (H) 70 - 99 mg/dL    BUN 10 7 - 20 mg/dL    CREATININE 0.7 0.6 - 1.1 mg/dL    GFR Non-African American >60 >60    GFR African American >60 >60    Calcium 9.6 8.3 - 10.6 mg/dL   Hepatic Function Panel   Result Value Ref Range    Total Protein 7.4 6.4 - 8.2 g/dL    Albumin 4.0 3.4 - 5.0 g/dL    Alkaline Phosphatase 72 40 - 129 U/L    ALT 12 10 - 40 U/L    AST 20 15 - 37 U/L    Total Bilirubin <0.2 0.0 - 1.0 mg/dL    Bilirubin, Direct <0.2 0.0 - 0.3 mg/dL    Bilirubin, Indirect see below 0.0 - 1.0 mg/dL   Troponin   Result Value Ref Range    Troponin <0.01 <0.01 ng/mL   Troponin   Result Value Ref Range    Troponin <0.01 <0.01 ng/mL   Sedimentation Rate   Result Value Ref Range    Sed Rate 40 (H) 0 - 20 mm/Hr   EKG 12 Lead   Result Value Ref Range    Ventricular Rate 91 BPM    Atrial Rate 91 BPM    P-R Interval 148 ms    QRS Duration 76 ms    Q-T Interval 350 ms    QTc Calculation (Bazett) 430 ms    P Axis 39 degrees    R Axis 18 degrees    T Axis 17 degrees    Diagnosis       EKG performed in ER and to be interpreted by ER physician. Confirmed by MD, ER ([id number]),  Anju Moore ([id number]) on 8/18/2021 2:36:42 PM       RECENT VITALS:  BP: 112/75, Temp: 97.3 °F (36.3 °C), Pulse: 93, Resp: 16, SpO2: 100 %     Procedures     n/a    ED Course     Nursing Notes, Past Medical Hx,Past Surgical Hx, Social Hx, Allergies, and Family Hx were reviewed. The patient was given the following medications:  Orders Placed This Encounter   Medications    ipratropium-albuterol (DUONEB) nebulizer solution 1 ampule     Order Specific Question:   Initiate RT Bronchodilator Protocol     Answer:    Yes    albuterol sulfate HFA (PROVENTIL HFA) 108 (90 Base) MCG/ACT inhaler     Sig: Inhale 2 puffs into the lungs every 4 hours as needed for Wheezing or Shortness of Breath (Space out to every 6 hours as symptoms improve) Space out to every 6 hours as symptoms improve. Dispense:  1 Inhaler     Refill:  0       CONSULTS:  EMILY Troy 54 / ASSESSMENT / PLAN     Vitals:    08/18/21 0957 08/18/21 1114 08/18/21 1248   BP: 131/81 112/75    Pulse: 95 93    Resp: 18 (!) 7 16   Temp: 97.3 °F (36.3 °C)     TempSrc: Oral     SpO2: 100% 100% 100%   Weight: 180 lb (81.6 kg)     Height: 5' 4\" (1.626 m)         Farida Diggs is a 38 y.o. female who presents to the emergency department with chest pain. Patient was seated at work earlier today when she had sudden onset development of chest tightness as well as feeling that she could not take a deep breath. Symptoms were similar to her previous episode that led her to come to the emergency department on 8/9. She saw her PCP 2 days ago who ordered an outpatient stress test.  The patient has remained hemodynamically stable throughout their stay in the emergency department, and appears well overall. On chart review patient was initially seen for this chest pain on 8/9/2021. She had negative CTA imaging though did have infiltrate of her IV at that time, had a repeat visit on 8/10 for related swelling that is since improved. Patient also saw her new primary care provider in 8/16 who ordered an outpatient stress test to be performed. The patient's EKG is non-ischemic with two negative troponins and a clear chest x-ray. When patient was here on 8/9 she had CTA imaging, therefore I do not feel that this needs to be repeated at this time. Additionally patient's vital signs do not indicate any evidence of aortic etiology or pulmonary embolism. Patient was agreeable to this as well.   I did offer the patient admission given that this is her second visit within the past 10 days for similar symptoms, she declined. I did discuss this with her primary care provider who will also order PFTs as an outpatient and was made aware of the patient's visit to the emergency department. She also requested that an ESR be drawn prior to the patient being discharged. Patient feels comfortable with plan for discharge home with strict return precautions and close follow-up. Patient instructed to follow-up with PCP as soon as possible, and given strict return precautions. The patient was evaluated by myself and the ED Attending Physician, Dr. Hermann Henry. All management and disposition plans were discussed and agreed upon. Clinical Impression     1. Chest tightness      This note was dictated using voice-recognition software, which occasionally leads to inadvertent typographic errors.     Disposition     PATIENT REFERRED TO:  Bernie Raymond, [address] [facility]  [phone]    Schedule an appointment as soon as possible for a visit     DISCHARGE MEDICATIONS:  Discharge Medication List as of 8/18/2021  1:51 PM      START taking these medications    Details   albuterol sulfate HFA (PROVENTIL HFA) 108 (90 Base) MCG/ACT inhaler Inhale 2 puffs into the lungs every 4 hours as needed for Wheezing or Shortness of Breath (Space out to every 6 hours as symptoms improve) Space out to every 6 hours as symptoms improve., Disp-1 Inhaler, R-0Print           DISPOSITION Decision To Discharge 08/18/2021 02:12:31 PM     [facility] [place], [place]  08/18/21 0455

## 2021-08-18 NOTE — FLOWSHEET NOTE
08/18/21 1108   Encounter Summary   Services provided to: Patient   Referral/Consult From: [facility] Family members; [facility]   Continue Visiting   (8/18/21, BARBARA. )   Complexity of Encounter Moderate   Length of Encounter 15 minutes   Routine   Type Initial   Assessment Calm; Approachable   Intervention Nurtured hope   Outcome Expressed gratitude

## 2021-08-18 NOTE — TELEPHONE ENCOUNTER
Received call from Marly at [facility] with Red Flag Complaint. Brief description of triage: chest pain    Triage indicates for patient estrellita seen in ER now. Driving to ER while we were on phone. Advised to pull over and call 911 if in distress. Stated she is approximately three miles from the ER exit and feels ok driving. Care advice provided, patient verbalizes understanding; denies any other questions or concerns; instructed to call back for any new or worsening symptoms. Attention Provider: Thank you for allowing me to participate in the care of your patient. The patient was connected to triage in response to information provided to the [facility]. Please do not respond through this encounter as the response is not directed to a shared pool. Reason for Disposition   Pain also in shoulder(s) or arm(s) or jaw    Answer Assessment - Initial Assessment Questions  1. LOCATION: \"Where does it hurt? \"        Between breast    2. RADIATION: \"Does the pain go anywhere else? \" (e.g., into neck, jaw, arms, back)     Both shoulders    3. ONSET: \"When did the chest pain begin? \" (Minutes, hours or days)    40 minutes ago    4. PATTERN \"Does the pain come and go, or has it been constant since it started? \"  \"Does it get worse with exertion? \"   Constant    5. DURATION: \"How long does it last\" (e.g., seconds, minutes, hours)  40 minutes    6. SEVERITY: \"How bad is the pain? \"  (e.g., Scale 1-10; mild, moderate, or severe)     - MILD (1-3): doesn't interfere with normal activities      - MODERATE (4-7): interferes with normal activities or awakens from sleep     - SEVERE (8-10): excruciating pain, unable to do any normal activities     moderate    7. CARDIAC RISK FACTORS: \"Do you have any history of heart problems or risk factors for heart disease? \" (e.g., angina, prior heart attack; diabetes, high blood pressure, high cholesterol, smoker, or strong family history of heart disease)  no    8.  PULMONARY RISK FACTORS: \"Do you have any history of lung disease? \"  (e.g., blood clots in lung, asthma, emphysema, birth control pills)  No    9. CAUSE: \"What do you think is causing the chest pain? \"  Unsure    10. OTHER SYMPTOMS: \"Do you have any other symptoms? \" (e.g., dizziness, nausea, vomiting, sweating, fever, difficulty breathing, cough)   Shortness of breath, dizziness    11. PREGNANCY: \"Is there any chance you are pregnant? \" \"When was your last menstrual period? \"     No, July    Protocols used: CHEST PAIN-ADULT-[place]

## 2021-08-19 ENCOUNTER — OFFICE VISIT (OUTPATIENT)
Dept: INTERNAL MEDICINE CLINIC | Age: 36
End: 2021-08-19
Payer: COMMERCIAL

## 2021-08-19 VITALS
SYSTOLIC BLOOD PRESSURE: 122 MMHG | WEIGHT: 183 LBS | HEART RATE: 98 BPM | HEIGHT: 64 IN | BODY MASS INDEX: 31.24 KG/M2 | OXYGEN SATURATION: 98 % | DIASTOLIC BLOOD PRESSURE: 76 MMHG

## 2021-08-19 DIAGNOSIS — R06.02 SHORTNESS OF BREATH: Primary | ICD-10-CM

## 2021-08-19 DIAGNOSIS — R07.89 OTHER CHEST PAIN: ICD-10-CM

## 2021-08-19 PROCEDURE — 1111F DSCHRG MED/CURRENT MED MERGE: CPT | Performed by: INTERNAL MEDICINE

## 2021-08-19 PROCEDURE — 99215 OFFICE O/P EST HI 40 MIN: CPT | Performed by: INTERNAL MEDICINE

## 2021-08-19 ASSESSMENT — ENCOUNTER SYMPTOMS
SORE THROAT: 0
BACK PAIN: 0
COUGH: 0
ABDOMINAL PAIN: 0
ORTHOPNEA: 0
HEMOPTYSIS: 0
BLOOD IN STOOL: 0
VOMITING: 0
NAUSEA: 0
SHORTNESS OF BREATH: 1

## 2021-08-19 NOTE — LETTER
[place] [address]  Phone: [phone]  Fax: [phone]    Charles Thorne MD        August 19, 2021     Patient: [place]   YOB: 1985   Date of Visit: 8/19/2021       To Whom it May Concern:    Deo Juarez was seen in my clinic on 8/19/2021. She may return to work on 8/19/21. If you have any questions or concerns, please don't hesitate to call.     Sincerely,         Charles Thorne MD

## 2021-08-19 NOTE — PROGRESS NOTES
tablet   TAKE 1 TABLET BY MOUTH EVERY DAY             verapamil (VERELAN) 120 MG extended release capsule  Take 120 mg by mouth daily                    Medications marked \"taking\" at this time  Outpatient Medications Marked as Taking for the 8/19/21 encounter (Office Visit) with Margherita Ahumada, MD   Medication Sig Dispense Refill    albuterol sulfate HFA (PROVENTIL HFA) 108 (90 Base) MCG/ACT inhaler Inhale 2 puffs into the lungs every 4 hours as needed for Wheezing or Shortness of Breath (Space out to every 6 hours as symptoms improve) Space out to every 6 hours as symptoms improve. 1 Inhaler 0    triamterene-hydroCHLOROthiazide (MAXZIDE-25) 37.5-25 MG per tablet triamterene 37.5 mg-hydrochlorothiazide 25 mg tablet   TAKE 1 TABLET BY MOUTH EVERY DAY      dicyclomine (BENTYL) 20 MG tablet dicyclomine 20 mg tablet   TAKE 1 TABLETS BY MOUTH 4 TIMES A DAY AS NEEDED FOR IRRITABLE BOWEL SYNDROME      verapamil (VERELAN) 120 MG extended release capsule Take 120 mg by mouth daily           Medications patient taking as of now reconciled against medications ordered at time of hospital discharge: Yes    Chief Complaint   Patient presents with    Follow-Up from Hospital     some chest pain relief. Chest Pain   This is a new problem. The current episode started in the past 7 days. The onset quality is sudden. The problem occurs intermittently. The problem has been gradually improving. The pain is present in the substernal region. The pain is at a severity of 6/10. The pain is moderate. The quality of the pain is described as pressure. The pain radiates to the left shoulder and right shoulder. Associated symptoms include shortness of breath. Pertinent negatives include no abdominal pain, back pain, cough, dizziness, fever, headaches, hemoptysis, nausea, orthopnea, palpitations, vomiting or weakness. The pain is aggravated by nothing. Treatments tried: Albuterol inhaler.  The treatment provided significant relief. There are no known risk factors. Pertinent negatives for past medical history include no anxiety/panic attacks, no aortic aneurysm, no aortic dissection and no CAD. Prior diagnostic workup includes chest x-ray. Shortness of Breath  This is a new problem. The current episode started in the past 7 days. The problem occurs intermittently. The problem has been gradually improving. Associated symptoms include chest pain. Pertinent negatives include no abdominal pain, fever, headaches, hemoptysis, leg swelling, orthopnea, sore throat or vomiting. Nothing aggravates the symptoms. The patient has no known risk factors for DVT/PE. She has tried beta agonist inhalers for the symptoms. The treatment provided significant relief. Her past medical history is significant for asthma. There is no history of allergies, aspirin allergies or CAD. Inpatient course: Discharge summary reviewed- see chart. Interval history/Current status: Patient was seen in the ED yesterday for chest pain and shortness of breath. She was treated with albuterol nebulization that improved her symptoms of shortness of breath and chest pain. Patient was discharged on albuterol inhaler as needed for shortness of breath. She used inhaler yesterday night when she had the symptoms of chest pain or shortness of breath and the symptoms were improved. Patient states her symptoms of chest pain and shortness of breath have improved since initiating albuterol inhaler. She has her pulmonary function test scheduled next week. Also nuclear medicine stress test was ordered during the previous office visit by her PCP and the patient states she will be scheduling it for next week. Review of Systems   Constitutional: Negative for fatigue and fever. HENT: Negative for nosebleeds and sore throat. Respiratory: Positive for shortness of breath. Negative for cough and hemoptysis. Cardiovascular: Positive for chest pain.  Negative for palpitations, orthopnea and leg swelling. Gastrointestinal: Negative for abdominal pain, blood in stool, nausea and vomiting. Musculoskeletal: Negative for back pain. Neurological: Negative for dizziness, weakness and headaches. Vitals:    08/19/21 1042   BP: 122/76   Pulse: 98   SpO2: 98%   Weight: 183 lb (83 kg)   Height: 5' 4\" (1.626 m)     Body mass index is 31.41 kg/m². Wt Readings from Last 3 Encounters:   08/19/21 183 lb (83 kg)   08/18/21 180 lb (81.6 kg)   08/16/21 183 lb (83 kg)     BP Readings from Last 3 Encounters:   08/19/21 122/76   08/18/21 112/75   08/16/21 132/76       Physical Exam  Constitutional:       Appearance: Normal appearance. HENT:      Head: Normocephalic and atraumatic. Eyes:      General: No scleral icterus. Conjunctiva/sclera: Conjunctivae normal.   Cardiovascular:      Rate and Rhythm: Normal rate and regular rhythm. Pulses: Normal pulses. Heart sounds: Normal heart sounds. Pulmonary:      Effort: Pulmonary effort is normal.      Breath sounds: Normal breath sounds. Musculoskeletal:         General: No swelling. Skin:     General: Skin is warm and dry. Neurological:      Mental Status: She is alert and oriented to person, place, and time. Mental status is at baseline. Psychiatric:         Mood and Affect: Mood normal.         Behavior: Behavior normal.             Assessment/Plan:  1. Other chest pain  Chest pain improving with albuterol inhaler. Pt needs to schedule NM stress test for further workup. Advised to return to ED if she develops severe chest pain. - [place] DISCHARGE MEDS RECONCILED W/ CURRENT OUTPATIENT MED LIST    2. Shortness of breath  Continue Albuterol inhaler PRN for shortness of breath. Pt has PFT scheduled next week.   Advised to return to ED if she has worsening dyspnea that is not relieved with albuterol inhaler.  - [place] DISCHARGE MEDS RECONCILED W/ CURRENT OUTPATIENT MED LIST        Medical Decision Making: high complexity

## 2021-08-25 ENCOUNTER — HOSPITAL ENCOUNTER (OUTPATIENT)
Dept: PULMONOLOGY | Age: 36
Discharge: HOME | End: 2021-08-25
Payer: COMMERCIAL

## 2021-08-25 VITALS — OXYGEN SATURATION: 99 %

## 2021-08-25 DIAGNOSIS — R06.02 SHORTNESS OF BREATH: ICD-10-CM

## 2021-08-25 PROCEDURE — 94060 EVALUATION OF WHEEZING: CPT

## 2021-08-25 PROCEDURE — 94760 N-INVAS EAR/PLS OXIMETRY 1: CPT

## 2021-08-25 PROCEDURE — 94729 DIFFUSING CAPACITY: CPT

## 2021-08-25 PROCEDURE — 94726 PLETHYSMOGRAPHY LUNG VOLUMES: CPT

## 2021-08-25 PROCEDURE — 94664 DEMO&/EVAL PT USE INHALER: CPT

## 2021-08-25 PROCEDURE — 6360000002 HC RX W HCPCS: Performed by: INTERNAL MEDICINE

## 2021-08-25 RX ORDER — ALBUTEROL SULFATE 2.5 MG/3ML
2.5 SOLUTION RESPIRATORY (INHALATION) ONCE
Status: COMPLETED | OUTPATIENT
Start: 2021-08-25 | End: 2021-08-25

## 2021-08-25 RX ADMIN — ALBUTEROL SULFATE 2.5 MG: 2.5 SOLUTION RESPIRATORY (INHALATION) at 16:24

## 2021-08-27 NOTE — PROCEDURES
[address] [facility]               [address]                               PULMONARY FUNCTION    PATIENT NAME: Eamon Larsen                    :        1985  MED REC NO:   [id number]                          ROOM:  ACCOUNT NO:   [de-identified]                           ADMIT DATE: 2021  PROVIDER:     Zechariah Mendes MD    DATE OF PROCEDURE:  2021    INDICATIONS:  Shortness of breath. BMI:  30.3. TOBACCO HISTORY:  Never smoked. Spirometry data is acceptable and reproducible. Albuterol given per protocol. Lung volumes performed via plethysmography. Diffusion capacity not adjusted for hemoglobin. Room air oxygen saturation is 99%. SPIROMETRY:  FEV1 to FVC ratio is 84%. Prebronchodilator FEV1 is 2.94,  which is 109% of predicted. Prebronchodilator FVC is 3.51, which is  107% of predicted. Lung volumes show a total lung capacity of 3.96, which is 75% of  predicted. Residual volume is 0.57, which is 35% of predicted. Diffusion capacity is 23.23 which is 81% of predicted. IMPRESSION:  1. Normal spirometry. 2.  There is no significant response to bronchodilators. 3.  There is a mild restrictive defect present.   4.  Diffusion capacity is normal.        Vanessa Wills MD    D: 2021 17:15:48       T: 2021 22:05:31     [email or web address]  Job#: [id number]     Doc#: [id number]    CC:

## 2021-08-30 ENCOUNTER — HOSPITAL ENCOUNTER (EMERGENCY)
Age: 36
Discharge: HOME | End: 2021-08-30
Attending: EMERGENCY MEDICINE
Payer: COMMERCIAL

## 2021-08-30 ENCOUNTER — NURSE TRIAGE (OUTPATIENT)
Dept: OTHER | Facility: CLINIC | Age: 36
End: 2021-08-30

## 2021-08-30 VITALS
WEIGHT: 180 LBS | RESPIRATION RATE: 15 BRPM | BODY MASS INDEX: 30.73 KG/M2 | HEIGHT: 64 IN | HEART RATE: 85 BPM | SYSTOLIC BLOOD PRESSURE: 111 MMHG | DIASTOLIC BLOOD PRESSURE: 72 MMHG | OXYGEN SATURATION: 100 % | TEMPERATURE: 98.8 F

## 2021-08-30 DIAGNOSIS — R06.02 SHORTNESS OF BREATH: Primary | ICD-10-CM

## 2021-08-30 PROCEDURE — 6370000000 HC RX 637 (ALT 250 FOR IP): Performed by: PHYSICIAN ASSISTANT

## 2021-08-30 PROCEDURE — 99284 EMERGENCY DEPT VISIT MOD MDM: CPT

## 2021-08-30 PROCEDURE — 6360000002 HC RX W HCPCS: Performed by: PHYSICIAN ASSISTANT

## 2021-08-30 PROCEDURE — 94640 AIRWAY INHALATION TREATMENT: CPT

## 2021-08-30 RX ORDER — IPRATROPIUM BROMIDE AND ALBUTEROL SULFATE 2.5; .5 MG/3ML; MG/3ML
1 SOLUTION RESPIRATORY (INHALATION) ONCE
Status: COMPLETED | OUTPATIENT
Start: 2021-08-30 | End: 2021-08-30

## 2021-08-30 RX ORDER — PREDNISONE 20 MG/1
40 TABLET ORAL ONCE
Status: COMPLETED | OUTPATIENT
Start: 2021-08-30 | End: 2021-08-30

## 2021-08-30 RX ORDER — PREDNISONE 20 MG/1
40 TABLET ORAL DAILY
Qty: 8 TABLET | Refills: 0 | Status: SHIPPED | OUTPATIENT
Start: 2021-08-30 | End: 2021-09-03

## 2021-08-30 RX ORDER — ALBUTEROL SULFATE 90 UG/1
2 AEROSOL, METERED RESPIRATORY (INHALATION) EVERY 4 HOURS PRN
Qty: 1 INHALER | Refills: 1 | Status: SHIPPED | OUTPATIENT
Start: 2021-08-30

## 2021-08-30 RX ORDER — ALBUTEROL SULFATE 2.5 MG/3ML
2.5 SOLUTION RESPIRATORY (INHALATION) ONCE
Status: COMPLETED | OUTPATIENT
Start: 2021-08-30 | End: 2021-08-30

## 2021-08-30 RX ADMIN — PREDNISONE 40 MG: 20 TABLET ORAL at 10:42

## 2021-08-30 RX ADMIN — IPRATROPIUM BROMIDE AND ALBUTEROL SULFATE 1 AMPULE: .5; 2.5 SOLUTION RESPIRATORY (INHALATION) at 09:20

## 2021-08-30 RX ADMIN — ALBUTEROL SULFATE 2.5 MG: 2.5 SOLUTION RESPIRATORY (INHALATION) at 09:20

## 2021-08-30 ASSESSMENT — ENCOUNTER SYMPTOMS
SHORTNESS OF BREATH: 1
DIARRHEA: 0
WHEEZING: 1
CHEST TIGHTNESS: 1
VOMITING: 0
ABDOMINAL PAIN: 0
COUGH: 0
NAUSEA: 0

## 2021-08-30 NOTE — TELEPHONE ENCOUNTER
Reason for Disposition   MODERATE difficulty breathing (e.g., speaks in phrases, SOB even at rest, pulse 100-120) of new onset or worse than normal    Answer Assessment - Initial Assessment Questions  1. RESPIRATORY STATUS: \"Describe your breathing? \" (e.g., wheezing, shortness of breath, unable to speak, severe coughing)       Feels sob. 2. ONSET: \"When did this breathing problem begin? \"       Woke up with it today , about 0500. Within last several weeks , she had some sob intermittently - had PFT last week. 3. PATTERN \"Does the difficult breathing come and go, or has it been constant since it started? \"      Constant    4. SEVERITY: \"How bad is your breathing? \" (e.g., mild, moderate, severe)     - MILD: No SOB at rest, mild SOB with walking, speaks normally in sentences, can lay down, no retractions, pulse < 100.     - MODERATE: SOB at rest, SOB with minimal exertion and prefers to sit, cannot lie down flat, speaks in phrases, mild retractions, audible wheezing, pulse 100-120.     - SEVERE: Very SOB at rest, speaks in single words, struggling to breathe, sitting hunched forward, retractions, pulse > 120       \"A later stage of moderate\"    5. RECURRENT SYMPTOM: \"Have you had difficulty breathing before? \" If so, ask: \"When was the last time? \" and \"What happened that time? \"      She felt worse when her lung collapsed as a child    6. CARDIAC HISTORY: \"Do you have any history of heart disease? \" (e.g., heart attack, angina, bypass surgery, angioplasty)       Denies - she has a stress test scheduled this Friday (she's been having chest pain over last couple weeks)    7. LUNG HISTORY: \"Do you have any history of lung disease? \"  (e.g., pulmonary embolus, asthma, emphysema)      Childhood asthma, and collapsed lung. 8. CAUSE: \"What do you think is causing the breathing problem? \"       Unsure    9.  OTHER SYMPTOMS: \"Do you have any other symptoms? (e.g., dizziness, runny nose, cough, chest pain, fever) Pressure in chest - feels like breathing is being \"cut off\". No fever, no dizzy. No cough. 10. PREGNANCY: \"Is there any chance you are pregnant? \" \"When was your last menstrual period? \"        No    11. TRAVEL: \"Have you traveled out of the country in the last month? \" (e.g., travel history, exposures)        no    Protocols used: BREATHING DIFFICULTY-ADULT-OH    Received call from [facility] with Red Flag Complaint. Brief description of triage: see above    Triage indicates for patient to go to ER    Care advice provided, patient verbalizes understanding; denies any other questions or concerns; instructed to call back for any new or worsening symptoms. Attention Provider: Thank you for allowing me to participate in the care of your patient. The patient was connected to triage in response to information provided to the [facility]. Please do not respond through this encounter as the response is not directed to a shared pool.

## 2021-08-30 NOTE — ED PROVIDER NOTES
[address] [facility] ENCOUNTER          PHYSICIAN ASSISTANT NOTE       Date of evaluation: 8/30/2021    Chief Complaint     Shortness of Breath (pt had asthma as child;had recent pulmonary function test; staes she only a puffer at home and when she has been evaluated before had HHN with good results; she was told to come to ED and get a breathing tx and Rx for HHN at home)      History of Present Illness     Albert Austin is a 38 y.o. female who presents with chief complaint of shortness of breath. Patient has a history of asthma as a child, although this has not flared up in many years until recently. States that she just moved to the area and has been struggling with worsening shortness of breath and difficulty breathing over the past several weeks. This is her third emergency department visit for this. She has also seen her primary care physician and had PFTs performed outpatient. States that every day she feels short of breath like Rosaline Hedrick is having an asthma attack\". She has been using her albuterol inhaler at home multiple times per day, although the only thing that has ever helped her symptoms has been a nebulized breathing treatment from the emergency department. Her symptoms returned this morning worse than the past several days, prompting her to come to the emergency department to obtain a breathing treatment. She spoke with the nurse at her primary care physician who requested she asked for a prescription for a nebulizer at home as well. Of note, during patient's prior emergency department visits, she was also complaining of chest tightness. She denies any chest pain today, although does feel slight tightening within her chest wall taking in deep breaths. Mainly presented for the feeling of wheezing and not getting enough air. She had a CTA of the chest on 8/9 which did not reveal any acute disorders such as pulmonary embolus that her symptoms could be contributed to. Had a full cardiac work-up at that time as well as on 8/18. She has a stress test scheduled for 9/3. Review of Systems     Review of Systems   Constitutional: Negative for chills, diaphoresis, fatigue and fever. Respiratory: Positive for chest tightness, shortness of breath and wheezing. Negative for cough. Cardiovascular: Negative for chest pain, palpitations and leg swelling. Gastrointestinal: Negative for abdominal pain, diarrhea, nausea and vomiting. Genitourinary: Negative. Musculoskeletal: Negative. Skin: Negative for rash. Neurological: Negative for dizziness, weakness, light-headedness and headaches. All other systems reviewed and are negative. Past Medical, Surgical, Family, and Social History     She has a past medical history of Asthma, Headache, Hypertension, and Pancreatitis. She has a past surgical history that includes ERCP (N/A) and Cholecystectomy, laparoscopic. Her family history includes Diabetes in her father; Hypertension in her father and mother; Migraines in her mother; Other in her sister; Stroke in her father. She reports that she has never smoked. She has never used smokeless tobacco. She reports previous alcohol use. She reports that she does not use drugs. Medications     Previous Medications    DICYCLOMINE (BENTYL) 20 MG TABLET    dicyclomine 20 mg tablet   TAKE 1 TABLETS BY MOUTH 4 TIMES A DAY AS NEEDED FOR IRRITABLE BOWEL SYNDROME    TRIAMTERENE-HYDROCHLOROTHIAZIDE (MAXZIDE-25) 37.5-25 MG PER TABLET    triamterene 37.5 mg-hydrochlorothiazide 25 mg tablet   TAKE 1 TABLET BY MOUTH EVERY DAY    VERAPAMIL (VERELAN) 120 MG EXTENDED RELEASE CAPSULE    Take 120 mg by mouth daily        Allergies     She is allergic to other, sumatriptan, guaifenesin, morphine, and oxycodone-acetaminophen. Physical Exam     INITIAL VITALS: BP: 127/81, Temp: 98.8 °F (37.1 °C), Pulse: 87, Resp: 18, SpO2: 100 %  Physical Exam  Vitals and nursing note reviewed.    Constitutional: General: She is not in acute distress. Appearance: She is well-developed. She is not diaphoretic. HENT:      Head: Normocephalic and atraumatic. Eyes:      Conjunctiva/sclera: Conjunctivae normal.      Pupils: Pupils are equal, round, and reactive to light. Cardiovascular:      Rate and Rhythm: Normal rate and regular rhythm. Heart sounds: Normal heart sounds. No murmur heard. No friction rub. Pulmonary:      Effort: No respiratory distress. Breath sounds: No wheezing or rales. Comments: Mildly tachypneic. Speaking in shortened sentences and gasping for air. No overt wheezing identified. Diminished breath sounds in the bases bilaterally. No accessory muscle usage. Musculoskeletal:         General: Normal range of motion. Cervical back: Normal range of motion. Skin:     General: Skin is warm and dry. Neurological:      Mental Status: She is alert and oriented to person, place, and time. Psychiatric:         Behavior: Behavior normal.         Thought Content: Thought content normal.         Judgment: Judgment normal.         Diagnostic Results     EKG   none    RADIOLOGY:  No orders to display       LABS:   No results found for this visit on 08/30/21. ED BEDSIDE ULTRASOUND:  none    RECENT VITALS:  BP: 111/72, Temp: 98.8 °F (37.1 °C), Pulse: 85, Resp: 15, SpO2: 100 %     Procedures     none    ED Course     Nursing Notes, Past Medical Hx,Past Surgical Hx, Social Hx, Allergies, and Family Hx were reviewed. The patient was given the following medications:  Orders Placed This Encounter   Medications    ipratropium-albuterol (DUONEB) nebulizer solution 1 ampule     Order Specific Question:   Initiate RT Bronchodilator Protocol     Answer: Yes    albuterol (PROVENTIL) nebulizer solution 2.5 mg     Order Specific Question:   Initiate RT Bronchodilator Protocol     Answer:    Yes    predniSONE (DELTASONE) 20 MG tablet     Sig: Take 2 tablets by mouth daily for 4 days     Dispense:  8 tablet     Refill:  0    albuterol sulfate HFA (PROVENTIL HFA) 108 (90 Base) MCG/ACT inhaler     Sig: Inhale 2 puffs into the lungs every 4 hours as needed for Wheezing or Shortness of Breath (Space out to every 6 hours as symptoms improve) Space out to every 6 hours as symptoms improve. Dispense:  1 Inhaler     Refill:  1    predniSONE (DELTASONE) tablet 40 mg       CONSULTS:  None    MEDICAL DECISION MAKING / ASSESSMENT / Martina Salvador is a 38 y.o. female presenting with chief complaint of shortness of breath. Patient is hemodynamically stable on arrival, oxygen saturation of 100% on room air. She is not tachycardic. No fever noted. Patient does speak in shortened sentences, although is in no respiratory distress. Breath sounds present bilaterally. Diminished air movement throughout, although no overt wheezing is noted. DuoNeb and albuterol were provided to the patient and she reports feeling significantly improved following these measures, although there was no significant change per respiratory therapy and her physical examination. Patient has had 2 separate work-ups for chest pain recently and has a stress test scheduled on Friday. She is not experiencing any chest pain whatsoever today, therefore low suspicion for ACS and I do not believe repeat evaluation today is indicated. Encouraged her to keep her stress test scheduled for Friday. In regards to her shortness of breath, she has had an outpatient PFT and the only thing that seems to help her at this time is nebulizer therapy. She will be given a refill of her albuterol inhaler, although nebulizer is not typically ordered from the emergency department. Encouraged her to contact her PCP for this prescription. Referral to pulmonology will also be placed.   Low suspicion for PE as symptoms have been ongoing since earlier this month and she has had a negative CTA of the chest.  Patient is in agreement with this plan. She will be initiated on prednisone therapy to determine if this is helpful with her symptoms. Encourage follow-up shortly with her primary care physician. Return precautions back to emergency department discussed, discharged in stable condition. This patient was also evaluated by the attending physician. All care plans were discussed and agreed upon. Clinical Impression     1.  Shortness of breath        Disposition     PATIENT REFERRED TO:  Gareld Habermann, MD  [address] [facility] [address] [phone]          [facility]  [address] [place] [address]  [phone]    As needed    Aida Patel MD  [address] [facility] [phone]            DISCHARGE MEDICATIONS:  New Prescriptions    PREDNISONE (DELTASONE) 20 MG TABLET    Take 2 tablets by mouth daily for 4 days       Linda 04, [address]  08/30/21 1038

## 2021-08-30 NOTE — ED PROVIDER NOTES
ED Attending Attestation Note     Date of evaluation: 8/30/2021    This patient was seen by the advance practice provider. I have seen and examined the patient, agree with the workup, evaluation, management and diagnosis. The care plan has been discussed. I have reviewed the ECG and concur with the ALFREDITO's interpretation. My assessment reveals a well-appearing adult female who is presenting for multiple episodes of shortness of breath recently with essentially negative work-ups, but does seem to improve with inhaled bronchodilators. On examination appreciate no rales or adventitious lung sounds in general, she is in no acute respiratory distress. We will try some steroids, recommend continued inhaled bronchodilators, has been referred to PCP previously and gotten PFTs.        Luanne Maria MD  08/30/21 [id number]

## 2021-08-31 ENCOUNTER — TELEPHONE (OUTPATIENT)
Dept: INTERNAL MEDICINE CLINIC | Age: 36
End: 2021-08-31

## 2021-08-31 DIAGNOSIS — J45.40 MODERATE PERSISTENT ASTHMA WITHOUT COMPLICATION: Primary | ICD-10-CM

## 2021-08-31 RX ORDER — SOFT LENS DISINFECTANT
1 SOLUTION, NON-ORAL MISCELLANEOUS 4 TIMES DAILY PRN
Qty: 1 DEVICE | Refills: 1 | Status: SHIPPED | OUTPATIENT
Start: 2021-08-31

## 2021-08-31 RX ORDER — NEBULIZER ACCESSORIES
1 KIT MISCELLANEOUS 4 TIMES DAILY PRN
Qty: 4 KIT | Refills: 5 | Status: SHIPPED | OUTPATIENT
Start: 2021-08-31

## 2021-08-31 RX ORDER — ALBUTEROL SULFATE 2.5 MG/3ML
2.5 SOLUTION RESPIRATORY (INHALATION) EVERY 6 HOURS PRN
Qty: 120 EACH | Refills: 5 | Status: SHIPPED | OUTPATIENT
Start: 2021-08-31

## 2021-08-31 RX ORDER — MONTELUKAST SODIUM 10 MG/1
10 TABLET ORAL DAILY
Qty: 30 TABLET | Refills: 5 | Status: SHIPPED | OUTPATIENT
Start: 2021-08-31 | End: 2021-09-27 | Stop reason: SDUPTHER

## 2021-08-31 NOTE — PROGRESS NOTES
Patient has had several ER visits with asthma attacks. CTA was negative troponins negative. Patient has order for stress test.  An order for ZIO monitor. She did get relief from hand-held nebulizer, more relief than she gets from albuterol. She was started on Advair and Singulair and will follow-up in a week. Patient will also need FMLA to cover her doctor visits.

## 2021-08-31 NOTE — TELEPHONE ENCOUNTER
Progress Notes  Corina Khan MD (Physician)   Internal Medicine     Patient has had several ER visits with asthma attacks. CTA was negative troponins negative. Patient has order for stress test.  An order for ZIO monitor. She did get relief from hand-held nebulizer, more relief than she gets from albuterol. She was started on Advair and Singulair and will follow-up in a week. Patient will also need FMLA to cover her doctor visits.

## 2021-08-31 NOTE — TELEPHONE ENCOUNTER
----- Message from Sania Celestin sent at 8/31/2021  8:55 AM EDT -----  Subject: Message to Provider    QUESTIONS  Information for Provider? Seen in the ED at [facility] on 8/30 for   SOB Dr. Daniel Tavares pt is requesting an breathing machine Please change appt   for 9/8/2021 as VV  ---------------------------------------------------------------------------  --------------  CALL BACK INFO  What is the best way for the office to contact you? OK to leave message on   voicemail  Preferred Call Back Phone Number? [phone]  ---------------------------------------------------------------------------  --------------  SCRIPT ANSWERS  Relationship to Patient?  Self

## 2021-09-02 ENCOUNTER — TELEPHONE (OUTPATIENT)
Dept: INTERNAL MEDICINE CLINIC | Age: 36
End: 2021-09-02

## 2021-09-02 PROBLEM — J45.40 MODERATE PERSISTENT ASTHMA WITHOUT COMPLICATION: Status: ACTIVE | Noted: 2021-09-02

## 2021-09-02 NOTE — TELEPHONE ENCOUNTER
----- Message from [facility] sent at 9/1/2021  3:01 PM EDT -----  Subject: Message to Provider    QUESTIONS  Information for Provider? Patient wants to confirm her [facility] paperwork fax   was received at Dr. Wilian Vega office. Patient said she do not have the fax   number with her right now but she already verified it twice. Please call   patient soon as possible at [phone] because she said her jobs needs the   [facility] paperwork. Patient also states to leave a very detailed message. ---------------------------------------------------------------------------  --------------  [employer] INFO  What is the best way for the office to contact you? OK to leave message on   voicemail, OK to respond with electronic message via [employer] portal (only   for patients who have registered [employer] account)  Preferred Call Back Phone Number? [phone]  ---------------------------------------------------------------------------  --------------  SCRIPT ANSWERS  Relationship to Patient? Self      :  :paperwork received and completed. I will bring it to office on 9/6/21.

## 2021-09-03 ENCOUNTER — TELEPHONE (OUTPATIENT)
Dept: ORTHOPEDIC SURGERY | Age: 36
End: 2021-09-03

## 2021-09-03 ENCOUNTER — HOSPITAL ENCOUNTER (OUTPATIENT)
Dept: NON INVASIVE DIAGNOSTICS | Age: 36
Discharge: HOME | End: 2021-09-03
Payer: COMMERCIAL

## 2021-09-03 ENCOUNTER — HOSPITAL ENCOUNTER (OUTPATIENT)
Dept: NUCLEAR MEDICINE | Age: 36
Discharge: HOME | End: 2021-09-03
Payer: COMMERCIAL

## 2021-09-03 ENCOUNTER — TELEPHONE (OUTPATIENT)
Dept: INTERNAL MEDICINE CLINIC | Age: 36
End: 2021-09-03

## 2021-09-03 DIAGNOSIS — R07.9 CHEST PAIN, UNSPECIFIED TYPE: ICD-10-CM

## 2021-09-03 LAB
LV EF: 65 %
LVEF MODALITY: NORMAL

## 2021-09-03 PROCEDURE — 78452 HT MUSCLE IMAGE SPECT MULT: CPT

## 2021-09-03 PROCEDURE — A9502 TC99M TETROFOSMIN: HCPCS | Performed by: INTERNAL MEDICINE

## 2021-09-03 PROCEDURE — 93017 CV STRESS TEST TRACING ONLY: CPT

## 2021-09-03 PROCEDURE — 3430000000 HC RX DIAGNOSTIC RADIOPHARMACEUTICAL: Performed by: INTERNAL MEDICINE

## 2021-09-03 RX ADMIN — TETROFOSMIN 10.4 MILLICURIE: 1.38 INJECTION, POWDER, LYOPHILIZED, FOR SOLUTION INTRAVENOUS at 13:30

## 2021-09-03 RX ADMIN — TETROFOSMIN 30 MILLICURIE: 1.38 INJECTION, POWDER, LYOPHILIZED, FOR SOLUTION INTRAVENOUS at 15:25

## 2021-09-03 NOTE — TELEPHONE ENCOUNTER
----- Message from [facility] sent at 9/1/2021  3:01 PM EDT -----  Subject: Message to Provider    QUESTIONS  Information for Provider? Patient wants to confirm her [facility] paperwork fax   was received at Dr. Simón White office. Patient said she do not have the fax   number with her right now but she already verified it twice. Please call   patient soon as possible at [phone] because she said her jobs needs the   [facility] paperwork. Patient also states to leave a very detailed message. ---------------------------------------------------------------------------  --------------  Dalia MCKEON  What is the best way for the office to contact you? OK to leave message on   [employer], OK to respond with electronic message via [employer] portal (only   for patients who have registered [employer] account)  Preferred Call Back Phone Number? [phone]  ---------------------------------------------------------------------------  --------------  SCRIPT ANSWERS  Relationship to Patient?  Self

## 2021-09-03 NOTE — TELEPHONE ENCOUNTER
Left a message on patients vociemail , letting her know we will fax them on Tuesday when seamus comes back int he building

## 2021-09-03 NOTE — TELEPHONE ENCOUNTER
PA submitted via CMM for Montelukast Sodium 10MG tablets. Key: [id number] - Rx #: [id number]    Response on CMM: Your PA has been resolved, no additional PA is required. Please advise patient. Thank you.

## 2021-09-04 DIAGNOSIS — R70.0 ELEVATED SED RATE: ICD-10-CM

## 2021-09-04 DIAGNOSIS — E55.9 VITAMIN D DEFICIENCY: ICD-10-CM

## 2021-09-04 DIAGNOSIS — D50.9 HYPOCHROMIC MICROCYTIC ANEMIA: Primary | ICD-10-CM

## 2021-09-04 DIAGNOSIS — E87.6 HYPOKALEMIA: ICD-10-CM

## 2021-09-04 RX ORDER — HYDROCHLOROTHIAZIDE 25 MG/1
12.5 TABLET ORAL DAILY
Qty: 45 TABLET | Refills: 1 | Status: SHIPPED | OUTPATIENT
Start: 2021-09-04

## 2021-09-04 RX ORDER — MELATONIN
2000 DAILY
Qty: 60 TABLET | Refills: 5 | Status: SHIPPED | OUTPATIENT
Start: 2021-09-04 | End: 2021-10-13

## 2021-09-04 RX ORDER — POTASSIUM CHLORIDE 20 MEQ/1
20 TABLET, EXTENDED RELEASE ORAL DAILY
Qty: 90 TABLET | Refills: 1 | Status: SHIPPED | OUTPATIENT
Start: 2021-09-04

## 2021-09-07 ENCOUNTER — TELEPHONE (OUTPATIENT)
Dept: INTERNAL MEDICINE CLINIC | Age: 36
End: 2021-09-07

## 2021-09-08 ENCOUNTER — VIRTUAL VISIT (OUTPATIENT)
Dept: INTERNAL MEDICINE CLINIC | Age: 36
End: 2021-09-08
Payer: COMMERCIAL

## 2021-09-08 DIAGNOSIS — E87.6 HYPOKALEMIA: ICD-10-CM

## 2021-09-08 DIAGNOSIS — A09 DIARRHEA OF INFECTIOUS ORIGIN: Primary | ICD-10-CM

## 2021-09-08 DIAGNOSIS — J45.40 MODERATE PERSISTENT ASTHMA WITHOUT COMPLICATION: ICD-10-CM

## 2021-09-08 PROCEDURE — 99213 OFFICE O/P EST LOW 20 MIN: CPT | Performed by: INTERNAL MEDICINE

## 2021-09-08 ASSESSMENT — ENCOUNTER SYMPTOMS
ABDOMINAL PAIN: 0
VOMITING: 0
SINUS PAIN: 0
SHORTNESS OF BREATH: 0
DIARRHEA: 1
NAUSEA: 1
COUGH: 0
BLOOD IN STOOL: 0
SWOLLEN GLANDS: 0
SORE THROAT: 0
BACK PAIN: 0
WHEEZING: 0
RHINORRHEA: 0

## 2021-09-08 NOTE — ASSESSMENT & PLAN NOTE
improving control on advair and singulair. less use of albuterol. on inhaler for only a week. Continue same regimen and follow.

## 2021-09-08 NOTE — ASSESSMENT & PLAN NOTE
improving in past 24 hours, since symptoms began. take peptobismal as directed. Patient has had the covid 19 vaccine. No myalgias or arthralgias or known covid 19 exposures. Patient will stay  home untill all s ymptoms resolved. temp max of 99.6.  taking fluids well. no more magnesium citrate.

## 2021-09-08 NOTE — PROGRESS NOTES
Oscar Sheridan (:  1985) is a 38 y.o. female,Established patient, here for evaluation of the following chief complaint(s): No chief complaint on file. ASSESSMENT/PLAN:  1. Diarrhea of infectious origin  Assessment & Plan:   improving in past 24 hours, since symptoms began. take peptobismal as directed. Patient has had the covid 19 vaccine. No myalgias or arthralgias or known covid 19 exposures. Patient will stay  home untill all s ymptoms resolved. temp max of 99.6.  taking fluids well. no more magnesium citrate. 2. Moderate persistent asthma without complication  Assessment & Plan:   improving control on advair and singulair. less use of albuterol. on inhaler for only a week. Continue same regimen and follow. 3. Hypokalemia  Assessment & Plan:   Asymptomatic, on kdur 20 meq daily and will go for repeat potassium level in one week. Return in about 4 weeks (around 10/6/2021). SUBJECTIVE/OBJECTIVE:  URI   This is a new problem. The current episode started yesterday. There has been no fever (99.4 was the highest.). Associated symptoms include diarrhea, headaches and nausea. Pertinent negatives include no abdominal pain, chest pain, coughing, dysuria, ear pain, joint pain, joint swelling, rhinorrhea, sinus pain, sneezing, sore throat, swollen glands, vomiting or wheezing. Associated symptoms comments: No appetite, chills, diarrhea times two days. Liquid stool 6 times aday yesterday and today. Patient took 3/4 of magnesium citrate. Global mild headache, tylenol helped with headache and temperature. . She has tried nothing for the symptoms. The treatment provided no relief. Follow up of hyperkalemia and is taking potassium  Review of Systems   Constitutional: Negative for fatigue and fever. HENT: Negative for ear pain, nosebleeds, rhinorrhea, sinus pain, sneezing and sore throat. Respiratory: Negative for cough, shortness of breath and wheezing.     Cardiovascular:

## 2021-09-13 ENCOUNTER — NURSE ONLY (OUTPATIENT)
Dept: CARDIOLOGY CLINIC | Age: 36
End: 2021-09-13

## 2021-09-13 PROCEDURE — 93246 EXT ECG>7D<15D RECORDING: CPT | Performed by: INTERNAL MEDICINE

## 2021-09-14 NOTE — PROGRESS NOTES
14 day CAM was placed during Lab/LILLIAM peacock.   Ordered by Dayna Granados  DX: Chest Pain/ Palpitations   Placed by Delon Diez   CAM ID # [id number]

## 2021-09-15 PROBLEM — Z11.59 ENCOUNTER FOR HEPATITIS C SCREENING TEST FOR LOW RISK PATIENT: Status: RESOLVED | Noted: 2021-08-16 | Resolved: 2021-09-15

## 2021-09-17 ENCOUNTER — TELEPHONE (OUTPATIENT)
Dept: INTERNAL MEDICINE CLINIC | Age: 36
End: 2021-09-17

## 2021-09-17 NOTE — TELEPHONE ENCOUNTER
Pt said her employer absence dept did not receive [facility] paperwork. They only received the cover sheet.    Fax to [phone] or  [phone]

## 2021-09-27 DIAGNOSIS — J45.40 MODERATE PERSISTENT ASTHMA WITHOUT COMPLICATION: ICD-10-CM

## 2021-09-27 RX ORDER — MONTELUKAST SODIUM 10 MG/1
10 TABLET ORAL DAILY
Qty: 30 TABLET | Refills: 5 | Status: SHIPPED | OUTPATIENT
Start: 2021-09-27 | End: 2021-09-28 | Stop reason: SDUPTHER

## 2021-09-28 DIAGNOSIS — J45.40 MODERATE PERSISTENT ASTHMA WITHOUT COMPLICATION: ICD-10-CM

## 2021-09-28 RX ORDER — MONTELUKAST SODIUM 10 MG/1
10 TABLET ORAL DAILY
Qty: 90 TABLET | Refills: 3 | Status: SHIPPED | OUTPATIENT
Start: 2021-09-28 | End: 2021-10-13 | Stop reason: CLARIF

## 2021-10-01 DIAGNOSIS — J45.40 MODERATE PERSISTENT ASTHMA WITHOUT COMPLICATION: ICD-10-CM

## 2021-10-13 ENCOUNTER — APPOINTMENT (OUTPATIENT)
Dept: MRI IMAGING | Age: 36
End: 2021-10-13
Payer: COMMERCIAL

## 2021-10-13 ENCOUNTER — APPOINTMENT (OUTPATIENT)
Dept: CT IMAGING | Age: 36
End: 2021-10-13
Payer: COMMERCIAL

## 2021-10-13 ENCOUNTER — HOSPITAL ENCOUNTER (EMERGENCY)
Age: 36
Discharge: HOME | End: 2021-10-13
Attending: EMERGENCY MEDICINE
Payer: COMMERCIAL

## 2021-10-13 ENCOUNTER — APPOINTMENT (OUTPATIENT)
Dept: GENERAL RADIOLOGY | Age: 36
End: 2021-10-13
Payer: COMMERCIAL

## 2021-10-13 VITALS
RESPIRATION RATE: 16 BRPM | HEIGHT: 64 IN | SYSTOLIC BLOOD PRESSURE: 131 MMHG | OXYGEN SATURATION: 98 % | HEART RATE: 96 BPM | TEMPERATURE: 97.7 F | DIASTOLIC BLOOD PRESSURE: 68 MMHG | WEIGHT: 179 LBS | BODY MASS INDEX: 30.56 KG/M2

## 2021-10-13 DIAGNOSIS — G43.009 MIGRAINE WITHOUT AURA AND WITHOUT STATUS MIGRAINOSUS, NOT INTRACTABLE: ICD-10-CM

## 2021-10-13 DIAGNOSIS — R55 SYNCOPE AND COLLAPSE: ICD-10-CM

## 2021-10-13 DIAGNOSIS — G81.94 LEFT HEMIPARESIS (HCC): Primary | ICD-10-CM

## 2021-10-13 DIAGNOSIS — E55.9 VITAMIN D DEFICIENCY: ICD-10-CM

## 2021-10-13 LAB
ANION GAP SERPL CALCULATED.3IONS-SCNC: 13 MMOL/L (ref 3–16)
BASOPHILS ABSOLUTE: 0.1 K/UL (ref 0–0.2)
BASOPHILS RELATIVE PERCENT: 0.5 %
BILIRUBIN URINE: NEGATIVE
BLOOD, URINE: NEGATIVE
BUN BLDV-MCNC: 7 MG/DL (ref 7–20)
CALCIUM SERPL-MCNC: 9.5 MG/DL (ref 8.3–10.6)
CHLORIDE BLD-SCNC: 101 MMOL/L (ref 99–110)
CLARITY: CLEAR
CO2: 23 MMOL/L (ref 21–32)
COLOR: YELLOW
CREAT SERPL-MCNC: 0.7 MG/DL (ref 0.6–1.1)
EKG ATRIAL RATE: 86 BPM
EKG DIAGNOSIS: NORMAL
EKG P AXIS: 59 DEGREES
EKG P-R INTERVAL: 140 MS
EKG Q-T INTERVAL: 346 MS
EKG QRS DURATION: 76 MS
EKG QTC CALCULATION (BAZETT): 414 MS
EKG R AXIS: 55 DEGREES
EKG T AXIS: 42 DEGREES
EKG VENTRICULAR RATE: 86 BPM
EOSINOPHILS ABSOLUTE: 0.2 K/UL (ref 0–0.6)
EOSINOPHILS RELATIVE PERCENT: 1.5 %
GFR AFRICAN AMERICAN: >60
GFR NON-AFRICAN AMERICAN: >60
GLUCOSE BLD-MCNC: 88 MG/DL (ref 70–99)
GLUCOSE URINE: NEGATIVE MG/DL
HCG QUALITATIVE: NEGATIVE
HCT VFR BLD CALC: 36.8 % (ref 36–48)
HEMOGLOBIN: 11.6 G/DL (ref 12–16)
KETONES, URINE: NEGATIVE MG/DL
LEUKOCYTE ESTERASE, URINE: NEGATIVE
LYMPHOCYTES ABSOLUTE: 1.7 K/UL (ref 1–5.1)
LYMPHOCYTES RELATIVE PERCENT: 15.5 %
MAGNESIUM: 2 MG/DL (ref 1.8–2.4)
MCH RBC QN AUTO: 23.6 PG (ref 26–34)
MCHC RBC AUTO-ENTMCNC: 31.6 G/DL (ref 31–36)
MCV RBC AUTO: 74.8 FL (ref 80–100)
MICROSCOPIC EXAMINATION: NORMAL
MONOCYTES ABSOLUTE: 0.9 K/UL (ref 0–1.3)
MONOCYTES RELATIVE PERCENT: 8.5 %
NEUTROPHILS ABSOLUTE: 7.9 K/UL (ref 1.7–7.7)
NEUTROPHILS RELATIVE PERCENT: 74 %
NITRITE, URINE: NEGATIVE
PDW BLD-RTO: 16.5 % (ref 12.4–15.4)
PH UA: 8 (ref 5–8)
PLATELET # BLD: 359 K/UL (ref 135–450)
PMV BLD AUTO: 8.2 FL (ref 5–10.5)
POTASSIUM REFLEX MAGNESIUM: 3.8 MMOL/L (ref 3.5–5.1)
PROTEIN UA: NEGATIVE MG/DL
RBC # BLD: 4.91 M/UL (ref 4–5.2)
SODIUM BLD-SCNC: 137 MMOL/L (ref 136–145)
SPECIFIC GRAVITY UA: 1.01 (ref 1–1.03)
TROPONIN: <0.01 NG/ML
URINE TYPE: NORMAL
UROBILINOGEN, URINE: 0.2 E.U./DL
WBC # BLD: 10.7 K/UL (ref 4–11)

## 2021-10-13 PROCEDURE — 72141 MRI NECK SPINE W/O DYE: CPT

## 2021-10-13 PROCEDURE — 71045 X-RAY EXAM CHEST 1 VIEW: CPT

## 2021-10-13 PROCEDURE — 99284 EMERGENCY DEPT VISIT MOD MDM: CPT

## 2021-10-13 PROCEDURE — 70450 CT HEAD/BRAIN W/O DYE: CPT

## 2021-10-13 PROCEDURE — 93005 ELECTROCARDIOGRAM TRACING: CPT | Performed by: PHYSICIAN ASSISTANT

## 2021-10-13 PROCEDURE — 36415 COLL VENOUS BLD VENIPUNCTURE: CPT

## 2021-10-13 PROCEDURE — 2580000003 HC RX 258: Performed by: PHYSICIAN ASSISTANT

## 2021-10-13 PROCEDURE — 6360000004 HC RX CONTRAST MEDICATION: Performed by: PHYSICIAN ASSISTANT

## 2021-10-13 PROCEDURE — 84484 ASSAY OF TROPONIN QUANT: CPT

## 2021-10-13 PROCEDURE — 70498 CT ANGIOGRAPHY NECK: CPT

## 2021-10-13 PROCEDURE — 80048 BASIC METABOLIC PNL TOTAL CA: CPT

## 2021-10-13 PROCEDURE — 70551 MRI BRAIN STEM W/O DYE: CPT

## 2021-10-13 PROCEDURE — 6360000002 HC RX W HCPCS: Performed by: PHYSICIAN ASSISTANT

## 2021-10-13 PROCEDURE — 93248 EXT ECG>7D<15D REV&INTERPJ: CPT | Performed by: INTERNAL MEDICINE

## 2021-10-13 PROCEDURE — 85025 COMPLETE CBC W/AUTO DIFF WBC: CPT

## 2021-10-13 PROCEDURE — 83735 ASSAY OF MAGNESIUM: CPT

## 2021-10-13 PROCEDURE — 81003 URINALYSIS AUTO W/O SCOPE: CPT

## 2021-10-13 PROCEDURE — 96374 THER/PROPH/DIAG INJ IV PUSH: CPT

## 2021-10-13 PROCEDURE — 84703 CHORIONIC GONADOTROPIN ASSAY: CPT

## 2021-10-13 RX ORDER — MAGNESIUM SULFATE IN WATER 40 MG/ML
2000 INJECTION, SOLUTION INTRAVENOUS ONCE
Status: DISCONTINUED | OUTPATIENT
Start: 2021-10-13 | End: 2021-10-13

## 2021-10-13 RX ORDER — ACETAMINOPHEN 325 MG/1
650 TABLET ORAL ONCE
Status: DISCONTINUED | OUTPATIENT
Start: 2021-10-13 | End: 2021-10-13

## 2021-10-13 RX ORDER — DROPERIDOL 2.5 MG/ML
1.25 INJECTION, SOLUTION INTRAMUSCULAR; INTRAVENOUS ONCE
Status: COMPLETED | OUTPATIENT
Start: 2021-10-13 | End: 2021-10-13

## 2021-10-13 RX ORDER — SODIUM CHLORIDE, SODIUM LACTATE, POTASSIUM CHLORIDE, AND CALCIUM CHLORIDE .6; .31; .03; .02 G/100ML; G/100ML; G/100ML; G/100ML
1000 INJECTION, SOLUTION INTRAVENOUS ONCE
Status: COMPLETED | OUTPATIENT
Start: 2021-10-13 | End: 2021-10-13

## 2021-10-13 RX ADMIN — SODIUM CHLORIDE, POTASSIUM CHLORIDE, SODIUM LACTATE AND CALCIUM CHLORIDE 1000 ML: 600; 310; 30; 20 INJECTION, SOLUTION INTRAVENOUS at 11:40

## 2021-10-13 RX ADMIN — DROPERIDOL 1.25 MG: 2.5 INJECTION, SOLUTION INTRAMUSCULAR; INTRAVENOUS at 11:40

## 2021-10-13 RX ADMIN — IOPAMIDOL 80 ML: 755 INJECTION, SOLUTION INTRAVENOUS at 10:54

## 2021-10-13 ASSESSMENT — ENCOUNTER SYMPTOMS
NAUSEA: 0
CHEST TIGHTNESS: 0
FACIAL SWELLING: 0
DIARRHEA: 0
VOICE CHANGE: 0
VOMITING: 0
BACK PAIN: 0
SHORTNESS OF BREATH: 0
SORE THROAT: 0
WHEEZING: 0
COUGH: 0
ABDOMINAL PAIN: 0
PHOTOPHOBIA: 0

## 2021-10-13 ASSESSMENT — PAIN DESCRIPTION - FREQUENCY: FREQUENCY: CONTINUOUS

## 2021-10-13 ASSESSMENT — PAIN SCALES - GENERAL: PAINLEVEL_OUTOF10: 9

## 2021-10-13 ASSESSMENT — PAIN DESCRIPTION - DESCRIPTORS: DESCRIPTORS: HEADACHE

## 2021-10-13 ASSESSMENT — PAIN DESCRIPTION - LOCATION: LOCATION: HEAD

## 2021-10-13 ASSESSMENT — PAIN DESCRIPTION - PAIN TYPE: TYPE: ACUTE PAIN

## 2021-10-13 ASSESSMENT — VISUAL ACUITY: OU: 1

## 2021-10-13 NOTE — ED NOTES
Pt states headache is better sensation and strength improving on left side     Lucy Landeros RN  10/13/21 9554

## 2021-10-13 NOTE — ED PROVIDER NOTES
[address] [facility] ENCOUNTER          PHYSICIAN ASSISTANT NOTE       Date of evaluation: 10/13/2021    Chief Complaint     Migraine (woke up this AM feeling slightly dizzy, feeling subsided but on the way to work it returned and patient fainted and hit head on floor) and Loss of Consciousness      History of Present Illness     Lorena Eugene is a 38 y.o. female with a PMH of hemiplegic migraines, asthma, HTN, and pancreatitis who presents to the ED following a fall and LOC this morning. Patient states she felt dizzy when she woke up this morning, however it lasted only a couple of minutes. Patient was changing a light around 7:45 this morning, when she was done she turned around and began feeling dizzy and \"seeing stars\". The next thing she remembers, she woke up on the ground around 8:30am, when she then called for an ambulance. Patient believes she fell to her knees and then hit the left side of her head on the hardwood floor. She has left sided hemiplegia following the fall, with left sided headache. Denies current N/V, vision changes, fever, SOB, chest pain. Denies right sided numbness or weakness. Patient takes verapamil daily for migraines, with her last hemiplegic migraine over 1 year ago. Review of Systems     Review of Systems   Constitutional: Negative for chills, fatigue and fever. HENT: Negative for congestion, ear pain, facial swelling, hearing loss, postnasal drip, sore throat, tinnitus and voice change. Eyes: Negative for photophobia and visual disturbance. Respiratory: Negative for cough, chest tightness, shortness of breath and wheezing. Cardiovascular: Negative for chest pain, palpitations and leg swelling. Gastrointestinal: Negative for abdominal pain, diarrhea, nausea and vomiting. Genitourinary: Negative for dysuria, flank pain, hematuria and pelvic pain. Musculoskeletal: Positive for gait problem (due to left sided hemiplegia).  Negative for back pain, neck pain and neck stiffness. Skin: Negative for rash and wound. Neurological: Positive for syncope, weakness (left side), numbness (left side) and headaches (left side). Negative for dizziness, facial asymmetry and light-headedness. Left sided hemiplegia. Dizziness prior to fall, no current dizziness   Psychiatric/Behavioral: Negative for confusion. Past Medical, Surgical, Family, and Social History     She has a past medical history of Asthma, Headache, Hypertension, and Pancreatitis. She has a past surgical history that includes ERCP (N/A) and Cholecystectomy, laparoscopic. Her family history includes Diabetes in her father; Hypertension in her father and mother; Migraines in her mother; Other in her sister; Stroke in her father. She reports that she has never smoked. She has never used smokeless tobacco. She reports previous alcohol use. She reports that she does not use drugs. Medications     Previous Medications    ALBUTEROL (PROVENTIL) (2.5 MG/3ML) 0.083% NEBULIZER SOLUTION    Take 3 mLs by nebulization every 6 hours as needed for Wheezing or Shortness of Breath    ALBUTEROL SULFATE HFA (PROVENTIL HFA) 108 (90 BASE) MCG/ACT INHALER    Inhale 2 puffs into the lungs every 4 hours as needed for Wheezing or Shortness of Breath (Space out to every 6 hours as symptoms improve) Space out to every 6 hours as symptoms improve.     FLUTICASONE-SALMETEROL (ADVAIR DISKUS) 250-50 MCG/DOSE AEPB    Inhale 1 puff into the lungs every 12 hours    HYDROCHLOROTHIAZIDE (HYDRODIURIL) 25 MG TABLET    Take 0.5 tablets by mouth daily Stop maxide 25    NEBULIZERS (AIRIAL COMPACT COMPRESSOR NEB) MISC        POTASSIUM CHLORIDE (KLOR-CON M) 20 MEQ EXTENDED RELEASE TABLET    Take 1 tablet by mouth daily    RESPIRATORY THERAPY SUPPLIES (NEBULIZER) SUSAN    1 Device by Does not apply route 4 times daily as needed (asthma attack)    RESPIRATORY THERAPY SUPPLIES (NEBULIZER/TUBING/MOUTHPIECE) KIT    1 kit by Does not apply route 4 times daily as needed (asthma attack)    VERAPAMIL (VERELAN) 120 MG EXTENDED RELEASE CAPSULE    Take 120 mg by mouth daily     VITAMIN D3 (CHOLECALCIFEROL) 25 MCG (1000 UT) TABS TABLET    Take 2 tablets by mouth daily       Allergies     She is allergic to other, sumatriptan, guaifenesin, morphine, and oxycodone-acetaminophen. Physical Exam     INITIAL VITALS: BP: (!) 133/90, Temp: 97.7 °F (36.5 °C), Pulse: 85, Resp: 17, SpO2: 100 %  Physical Exam  Vitals and nursing note reviewed. Constitutional:       General: She is awake. She is not in acute distress. Appearance: Normal appearance. She is well-developed, well-groomed and normal weight. She is not ill-appearing. HENT:      Head: Normocephalic and atraumatic. Comments: Tenderness to palpation diffuse left side head. No point tenderness     Right Ear: Hearing, tympanic membrane, ear canal and external ear normal.      Left Ear: Hearing, tympanic membrane, ear canal and external ear normal.      Nose: Nose normal.      Mouth/Throat:      Lips: Pink. Mouth: Mucous membranes are moist.      Pharynx: Oropharynx is clear. Eyes:      General: Lids are normal. Vision grossly intact. Gaze aligned appropriately. No visual field deficit. Extraocular Movements: Extraocular movements intact. Conjunctiva/sclera: Conjunctivae normal.      Pupils: Pupils are equal, round, and reactive to light. Cardiovascular:      Rate and Rhythm: Normal rate and regular rhythm. Pulses: Normal pulses. Heart sounds: Normal heart sounds. Pulmonary:      Effort: Pulmonary effort is normal.      Breath sounds: Normal breath sounds. No wheezing, rhonchi or rales. Abdominal:      General: Abdomen is flat. There is no distension. Palpations: Abdomen is soft. Tenderness: There is no abdominal tenderness. There is no right CVA tenderness, left CVA tenderness, guarding or rebound.    Musculoskeletal:         General: No swelling, tenderness, deformity or signs of injury. Cervical back: Normal range of motion and neck supple. No rigidity or tenderness. Right lower leg: No edema. Left lower leg: No edema. Comments: Tender to palpation bilateral patella with mild ecchymosis. Full range of motion of upper and lower extremities. Negative log roll. No spinous process tenderness of cervical, thoracic, or lumbar spine. Skin:     General: Skin is warm and dry. Neurological:      Mental Status: She is alert and oriented to person, place, and time. Cranial Nerves: No cranial nerve deficit. Sensory: Sensory deficit present. Motor: Weakness present. Gait: Gait abnormal.      Comments: Left sided hemiplegia (numbness and weakness). Left sided sensation intact, but decreased  Stoke scale 9  No slurred speech or facial droop. Repeat exam shows improved motor and sensory function of the left upper and lower extremity with improved strength and minimal drift. Stroke scale repeat is 3   Psychiatric:         Mood and Affect: Mood normal.         Behavior: Behavior normal. Behavior is cooperative. Thought Content: Thought content normal.         Judgment: Judgment normal.         Diagnostic Results       RADIOLOGY:  MRI CERVICAL SPINE WO CONTRAST   Final Result      Unremarkable MRI of the head and cervical spine. No cause for symptoms identified. MRI BRAIN WO CONTRAST   Final Result      Unremarkable MRI of the head and cervical spine. No cause for symptoms identified. CTA HEAD NECK W CONTRAST   Final Result      Neck      1. No significant vascular abnormality in the neck. Head      1. No evidence of large vessel occlusion. No significant intracranial vascular abnormality. No acute intracranial abnormality. CT Head WO Contrast   Final Result      Normal study. XR CHEST PORTABLE   Final Result      Clear lungs. Top normal heart size.           LABS:   Results for orders placed or performed during the hospital encounter of 10/13/21   CBC Auto Differential   Result Value Ref Range    WBC 10.7 4.0 - 11.0 K/uL    RBC 4.91 4.00 - 5.20 M/uL    Hemoglobin 11.6 (L) 12.0 - 16.0 g/dL    Hematocrit 36.8 36.0 - 48.0 %    MCV 74.8 (L) 80.0 - 100.0 fL    MCH 23.6 (L) 26.0 - 34.0 pg    MCHC 31.6 31.0 - 36.0 g/dL    RDW 16.5 (H) 12.4 - 15.4 %    Platelets 200 260 - 943 K/uL    MPV 8.2 5.0 - 10.5 fL    Neutrophils % 74.0 %    Lymphocytes % 15.5 %    Monocytes % 8.5 %    Eosinophils % 1.5 %    Basophils % 0.5 %    Neutrophils Absolute 7.9 (H) 1.7 - 7.7 K/uL    Lymphocytes Absolute 1.7 1.0 - 5.1 K/uL    Monocytes Absolute 0.9 0.0 - 1.3 K/uL    Eosinophils Absolute 0.2 0.0 - 0.6 K/uL    Basophils Absolute 0.1 0.0 - 0.2 K/uL   Basic Metabolic Panel w/ Reflex to MG   Result Value Ref Range    Sodium 137 136 - 145 mmol/L    Potassium reflex Magnesium 3.8 3.5 - 5.1 mmol/L    Chloride 101 99 - 110 mmol/L    CO2 23 21 - 32 mmol/L    Anion Gap 13 3 - 16    Glucose 88 70 - 99 mg/dL    BUN 7 7 - 20 mg/dL    CREATININE 0.7 0.6 - 1.1 mg/dL    GFR Non-African American >60 >60    GFR African American >60 >60    Calcium 9.5 8.3 - 10.6 mg/dL   Troponin   Result Value Ref Range    Troponin <0.01 <0.01 ng/mL   Urinalysis, reflex to microscopic   Result Value Ref Range    Color, UA Yellow Straw/Yellow    Clarity, UA Clear Clear    Glucose, Ur Negative Negative mg/dL    Bilirubin Urine Negative Negative    Ketones, Urine Negative Negative mg/dL    Specific Gravity, UA 1.015 1.005 - 1.030    Blood, Urine Negative Negative    pH, UA 8.0 5.0 - 8.0    Protein, UA Negative Negative mg/dL    Urobilinogen, Urine 0.2 <2.0 E.U./dL    Nitrite, Urine Negative Negative    Leukocyte Esterase, Urine Negative Negative    Microscopic Examination Not Indicated     Urine Type NotGiven    HCG Qualitative, Serum   Result Value Ref Range    hCG Qual Negative Detects HCG level >10 MIU/mL   EKG 12 Lead   Result Value Ref Range Ventricular Rate 86 BPM    Atrial Rate 86 BPM    P-R Interval 140 ms    QRS Duration 76 ms    Q-T Interval 346 ms    QTc Calculation (Bazett) 414 ms    P Axis 59 degrees    R Axis 55 degrees    T Axis 42 degrees    Diagnosis       EKG performed in ER and to be interpreted by ER physician. Confirmed by MD, ER ([id number]),  Marlo Sifuentes ([id number]) on 10/13/2021 10:10:39 AM         RECENT VITALS:  BP: 131/68, Temp: 97.7 °F (36.5 °C), Pulse: 96, Resp: 16, SpO2: 98 %     Procedures       ED Course     Nursing Notes, Past Medical Hx,Past Surgical Hx, Social Hx, Allergies, and Family Hx were reviewed. The patient was given the following medications:  Orders Placed This Encounter   Medications    lactated ringers bolus    DISCONTD: acetaminophen (TYLENOL) tablet 650 mg    droperidol (INAPSINE) injection 1.25 mg    iopamidol (ISOVUE-370) 76 % injection 80 mL    magnesium sulfate 2000 mg in 50 mL IVPB premix       CONSULTS:  IP CONSULT TO HOSPITALIST  IP CONSULT TO [address] [place] [address] / [facility] / [facility] is a 38 y.o. female presents with syncope, migraine and left hemiparesis. Patient has known history of migraine headaches with left hemiparesis in the past.  She does have a stroke scale of 9 with her hemiparesis today on arrival.  Her repeat stroke scale was 3. She has improvement of sensory and motor function of the left upper and lower extremities. CBC shows a white of 10.7 with hemoglobin 0.6 and 359 platelets. Urinalysis was negative. BMP is within normal limits. Troponin less than 0.01. Pregnancy test negative. Chest x-ray negative for acute disease. CT head shows normal study. CTA head and neck shows no evidence of large vessel occlusion and no intracranial vascular abnormality with no significant vascular abnormality in the neck.   Code stroke was called upon arrival and we did speak to the stroke team.  They recommended MRI and no TPA at this time due to patient's history of migraine hemiparesis. MRI of the cervical spine and MRI of the brain were unremarkable. Patient has been here for about 7 hours and has had improvement of her weakness and numbness to the left side. She has no slurred speech or facial droop. Initially we are going to admit the patient for improvement of symptoms and observation. After discussion with the patient and her father patient wants to go home at this time. She states she has had the symptoms at home before and has family that can help her. She does not want to be admitted and would like to go home with her father. I did speak to the hospitalist initially when the plan was to admit the patient. Hospitalist did perform consult on the patient in the emergency department and after their discussion it was decided that patient wanted to be discharged. We discussed risks and benefits of discharge including fall or worsening neurological symptoms. Patient is alert and oriented and competent to make this decision to leave the emergency department and not be admitted at this time. She has had the symptoms in the past.  She is returning back to baseline. She is stable for discharge. She is to follow-up with neurology. If any worsening symptoms, numbness weakness syncope she can return the emergency department. Also follow-up with PCP. This patient was also evaluated by the attending physician. All care plans were discussed and agreed upon. Clinical Impression     1. Left hemiparesis ([address]    2. Migraine without aura and without status migrainosus, not intractable    3.  Syncope and collapse        Disposition     PATIENT REFERRED TO:  Mirtha Akbar, [address] [facility] [address] [facility]  [phone]    Schedule an appointment as soon as possible for a visit       [facility] Emergency Department  [address] [place] [address]  [phone]    If symptoms worsen    Demetrius Soulier, MD  [address] [facility] [address]  [phone]    Schedule an appointment as soon as possible for a visit   with neurology      DISCHARGE MEDICATIONS:  New Prescriptions    No medications on file       DISPOSITION  discharge        Felicia Carter, [place]  10/13/21 [address]  10/13/21 [id number]

## 2021-10-13 NOTE — CONSULTS
Clinical Pharmacy Progress Note  Medication History     Admit Date: 10/13/2021    List of of current medications patient is taking is complete. Home Medication list in EPIC updated to reflect changes noted below. Source of information: Dispense Reports, Patient Conversation    Patient's home pharmacy: [id number] [phone]     Changes made to medication list:   Medications removed: (include reason, ex: therapy completed, inactive medication)   Singular- patient is not taking  Other notes:    Patients medication list is now complete. Please call with any questions.   Jenaro Espinosa, PharmD  PGY-1 Pharmacy Resident  [id number]  10/13/2021 4:34 PM

## 2021-10-13 NOTE — CONSULTS
Hospital Medicine  Consult History & Physical        Chief Complaint:  Left sided weakness    Date of Service: Pt seen/examined in consultation on 10/13/21    History Of Present Illness:      38 y.o. female who we are asked to see/evaluate by Trudy Perrin MD for evaluation of left sided weakness. Please have a history of asthma, left sided hemiplegic migraine, pancreatitis due to pancreatic divisum, left ankle edema for which she is on hydrochlorothiazide  -Presented to ED after a fall and loss of consciousness. She felt dizzy when she woke up this morning lasted a couple of minutes, she was changing a light bulb while climbing up the ladder at 745 this morning, she turned around felt dizzy started seeing stars  She remembers is being on the ground. She was noted to have left-sided weakness, EMS brought to the hospital, stroke protocol was initiated, CT head CTA MRI brain and cervical spine negative for acute etiologies, stroke was ruled out. I was called for admission for observation overnight until she regains her strength    Past Medical History:        Diagnosis Date    Asthma     Headache     Hypertension     Pancreatitis 2000       Past Surgical History:        Procedure Laterality Date    CHOLECYSTECTOMY, LAPAROSCOPIC      ERCP N/A        Medications Prior to Admission:    Prior to Admission medications    Medication Sig Start Date End Date Taking?  Authorizing Provider   fluticasone-salmeterol (ADVAIR DISKUS) 250-50 MCG/DOSE AEPB Inhale 1 puff into the lungs every 12 hours 10/1/21   Nahomy Parsons MD   montelukast (SINGULAIR) 10 MG tablet Take 1 tablet by mouth daily Stop if depression 9/28/21   Nahomy Parsons MD   Nebulizers (AIRIAL COMPACT COMPRESSOR NEB) [facility]  9/1/21   Historical Provider, MD   potassium chloride (KLOR-CON M) 20 MEQ extended release tablet Take 1 tablet by mouth daily 9/4/21   Nahomy Parsons MD   hydroCHLOROthiazide (HYDRODIURIL) 25 MG tablet Take 0.5 tablets by mouth daily Stop maxide 25 9/4/21   Stephy Zelaya MD   vitamin D3 (CHOLECALCIFEROL) 25 MCG (1000 UT) TABS tablet Take 2 tablets by mouth daily 9/4/21   Stephy Zelaya MD   Respiratory Therapy Supplies (NEBULIZER) SUSAN 1 Device by Does not apply route 4 times daily as needed (asthma attack) 8/31/21   Stephy Zelaya MD   Respiratory Therapy Supplies (NEBULIZER/TUBING/MOUTHPIECE) KIT 1 kit by Does not apply route 4 times daily as needed (asthma attack) 8/31/21   Stephy Zelaya MD   albuterol (PROVENTIL) (2.5 MG/3ML) 0.083% nebulizer solution Take 3 mLs by nebulization every 6 hours as needed for Wheezing or Shortness of Breath 8/31/21   Stephy Zelaya MD   albuterol sulfate HFA (PROVENTIL HFA) 108 (90 Base) MCG/ACT inhaler Inhale 2 puffs into the lungs every 4 hours as needed for Wheezing or Shortness of Breath (Space out to every 6 hours as symptoms improve) Space out to every 6 hours as symptoms improve. 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 She has never used smokeless tobacco.  ETOH:   reports previous alcohol use. Family History:     reviewed        Problem Relation Age of Onset    Hypertension Mother     Migraines Mother     Stroke Father     Hypertension Father     Diabetes Father     Other Sister         pancreatitis       REVIEW OF SYSTEMS:   Pertinent positives as noted in the HPI.  All other systems reviewed and negative. PHYSICAL EXAM PERFORMED:  /68   Pulse 96   Temp 97.7 °F (36.5 °C) (Oral)   Resp 16   Ht 5' 4\" (1.626 m)   Wt 179 lb (81.2 kg)   SpO2 98%   BMI 30.73 kg/m²   General appearance: No apparent distress, appears stated age and cooperative. HEENT: Normal cephalic, atraumatic without obvious deformity. Pupils equal, round, and reactive to light. Extra ocular muscles intact. Conjunctivae/corneas clear. Neck: Supple, with full range of motion. No jugular venous distention. Trachea midline. Respiratory:  Normal respiratory effort. Clear to auscultation, bilaterally without Rales/Wheezes/Rhonchi. Cardiovascular: Regular rate and rhythm with normal S1/S2 without murmurs, rubs or gallops. Abdomen: Soft, non-tender, non-distended with normal bowel sounds. Musculoskeletal: No clubbing, cyanosis or edema bilaterally. Skin: Skin color, texture, turgor normal.  No rashes or lesions. Neurologic: Left upper extremity 3 out of 5 strength, left lower extremity 3 out of 5 strength  Cranial nerves grossly intact  Right upper and lower extremity 5 out of 5 strength  Sensations intact  Reflexes normal  Psychiatric: Alert and oriented, thought content appropriate, normal insight  Capillary Refill: Brisk,< 3 seconds   Peripheral Pulses: +2 palpable, equal bilaterally     Labs:     Recent Labs     10/13/21  1038   WBC 10.7   HGB 11.6*   HCT 36.8        Recent Labs     10/13/21  1038      K 3.8      CO2 23   BUN 7   CREATININE 0.7   CALCIUM 9.5     No results for input(s): AST, ALT, BILIDIR, BILITOT, ALKPHOS in the last 72 hours. No results for input(s): INR in the last 72 hours. Recent Labs     10/13/21  1038   TROPONINI <0.01       Urinalysis:    Lab Results   Component Value Date    NITRU Negative 10/13/2021    WBCUA 3 08/16/2021    RBCUA 1 08/16/2021    BLOODU Negative 10/13/2021    SPECGRAV 1.015 10/13/2021    GLUCOSEU Negative 10/13/2021       Radiology:  I have reviewed the radiology reports with the following interpretation:     MRI CERVICAL SPINE WO CONTRAST   Final Result      Unremarkable MRI of the head and cervical spine. No cause for symptoms identified. MRI BRAIN WO CONTRAST   Final Result      Unremarkable MRI of the head and cervical spine. No cause for symptoms identified. CTA HEAD NECK W CONTRAST   Final Result      Neck      1. No significant vascular abnormality in the neck. Head      1. No evidence of large vessel occlusion. No significant intracranial vascular abnormality. No acute intracranial abnormality. CT Head WO Contrast   Final Result      Normal study. XR CHEST PORTABLE   Final Result      Clear lungs. Top normal heart size. EKG:  I have reviewed the EKG with the following interpretation:    @ekgread@      ASSESSMENT:    Hemiplegic migraine    Left-sided hemiparesis likely secondary left hemiplegic migraine, she has a history of this, work-up in the ED has ruled out acute CVA. PLAN:  I do not see a reason for admission, she has good family support and states she has dealt with this in the past and feels comfortable going home. She says that she knows how her body works and she will likely regain strength the next couple of days. She denies any needs. I discussed with emergency room physician assistant, she is agreeable with plan. She should follow-up with primary care physician as outpatient as well as possibly establish care with neurology.   She is from [place] and recently moved here a couple of days back    [facility] home    Thank you for the consultation    Tariq Fay MD   Hopsitalist

## 2021-10-13 NOTE — ED PROVIDER NOTES
ED Attending Attestation Note     Date of evaluation: 10/13/2021    This patient was seen by the advance practice provider. I have seen and examined the patient, agree with the workup, evaluation, management and diagnosis. The care plan has been discussed. I have reviewed the ECG and concur with the ALFREDITO's interpretation. My assessment reveals patient who presents stating that this morning she got up to change a light bulb became dizzy on the ladder and fell striking left side of head. She said when she came to she noticed that she could not move her left arm or her left leg. Her chief complaint when she came in was saying that she was having a hemiplegic migraine which she has a history of the last one being 7 years ago. On my exam she states she could not move her upper or lower extremity on the left side. On my exam she would not lift her left leg would not hold it against gravity same with her left arm. No open wounds noted of scalp.   Code stroke was called     Alice Wood MD  10/13/21 [id number]

## 2021-10-13 NOTE — Clinical Note
Daria Eugene was seen and treated in our emergency department on 10/13/2021. She may return to work on 10/17/2021. If you have any questions or concerns, please don't hesitate to call.       Jonathan Washburn

## 2021-10-14 DIAGNOSIS — G43.409 HEMIPLEGIC MIGRAINE WITHOUT STATUS MIGRAINOSUS, NOT INTRACTABLE: Primary | ICD-10-CM

## 2021-10-14 DIAGNOSIS — R00.2 PALPITATIONS: ICD-10-CM

## 2021-10-14 DIAGNOSIS — G43.111 INTRACTABLE MIGRAINE WITH AURA WITH STATUS MIGRAINOSUS: ICD-10-CM

## 2021-11-10 RX ORDER — MONTELUKAST SODIUM 10 MG/1
10 TABLET ORAL NIGHTLY
Qty: 90 TABLET | Refills: 0 | Status: SHIPPED | OUTPATIENT
Start: 2021-11-10

## 2021-11-10 RX ORDER — MONTELUKAST SODIUM 10 MG/1
10 TABLET ORAL NIGHTLY
COMMUNITY
End: 2021-11-10 | Stop reason: SDUPTHER

## 2021-11-17 ENCOUNTER — VIRTUAL VISIT (OUTPATIENT)
Dept: INTERNAL MEDICINE CLINIC | Age: 36
End: 2021-11-17
Payer: COMMERCIAL

## 2021-11-17 DIAGNOSIS — J02.9 PHARYNGITIS, UNSPECIFIED ETIOLOGY: Primary | ICD-10-CM

## 2021-11-17 PROCEDURE — 99212 OFFICE O/P EST SF 10 MIN: CPT | Performed by: INTERNAL MEDICINE

## 2021-11-17 RX ORDER — AZITHROMYCIN 250 MG/1
250 TABLET, FILM COATED ORAL SEE ADMIN INSTRUCTIONS
Qty: 6 TABLET | Refills: 0 | Status: SHIPPED | OUTPATIENT
Start: 2021-11-17 | End: 2021-11-22

## 2021-11-17 ASSESSMENT — ENCOUNTER SYMPTOMS
WHEEZING: 0
SORE THROAT: 1
SWOLLEN GLANDS: 1

## 2021-11-17 NOTE — PROGRESS NOTES
Price Wright (:  1985) is a 43 y.o. female,Established patient, here for evaluation of the following chief complaint(s): No chief complaint on file. ASSESSMENT/PLAN:  1. Pharyngitis, unspecified etiology in a patient who has had the Covid vaccine. Her last visors shot was in May 2021. Patient advised to get booster once all the upper respiratory symptoms have resolved. Because we are in the pandemic we will send her for Covid test.  We will treat the pharyngitis with a Zithromax Z-MISSAEL.  -     COVID-19; Future  -     azithromycin (ZITHROMAX) 250 MG tablet; Take 1 tablet by mouth See Admin Instructions for 5 days 500mg on day 1 followed by 250mg on days 2 - 5, Disp-6 tablet, R-0Normal      Return in about 3 months (around 2022), or if symptoms worsen or fail to improve. SUBJECTIVE/OBJECTIVE:  URI   This is a new problem. The current episode started yesterday. The maximum temperature recorded prior to her arrival was 100.4 - 100.9 F. Associated symptoms include congestion, a sore throat and swollen glands. Pertinent negatives include no abdominal pain, chest pain, coughing, diarrhea, ear pain, headaches, nausea, rash, sinus pain, vomiting or wheezing. Associated symptoms comments: No loss of taste or smell but decreased appetite. No diarrhea or nausea. . She has tried nothing for the symptoms. The treatment provided no relief. Review of Systems   Constitutional: Negative. Negative for diaphoresis. HENT: Positive for congestion and sore throat. Negative for dental problem, drooling, ear discharge, ear pain, facial swelling, hearing loss, nosebleeds, sinus pressure, sinus pain, trouble swallowing and voice change. Hearing loss right ear. Eyes: Negative. Eye exam 1.5 years ago. Respiratory: Negative for cough, shortness of breath and wheezing. Cardiovascular: Negative for chest pain and palpitations.    Gastrointestinal: Negative for abdominal distention, abdominal pain, anal bleeding, blood in stool, constipation, diarrhea, nausea and vomiting. Pancreatic divisum  Past bout of severe pancreatitis, prior to gall bladder removal.   Endocrine: Negative. Negative for cold intolerance, heat intolerance, polydipsia, polyphagia and polyuria. Genitourinary:        Maternal great aunt with breast cancer. Menarche age 13.   Uterine fibroid. Has pap appointment 21   Musculoskeletal: Negative. Skin: Negative for rash. Allergic/Immunologic: Negative. Neurological: Negative for headaches. Hemiplegic migraine   Hematological: Negative. Psychiatric/Behavioral: Negative. No flowsheet data found. Physical Exam    [INSTRUCTIONS:  \"[x]\" Indicates a positive item  \"[]\" Indicates a negative item  -- DELETE ALL ITEMS NOT EXAMINED]    Constitutional: [x] Appears well-developed and well-nourished [] No apparent distress      [x] Abnormal -patient looks like she is not feeling well. Mental status: [x] Alert and awake  [x] Oriented to person/place/time [x] Able to follow commands    [] Abnormal -     Eyes:   EOM    [x]  Normal    [] Abnormal -   Sclera  [x]  Normal    [] Abnormal -          Discharge [x]  None visible   [] Abnormal -      HENT: [x] Normocephalic, atraumatic  [] Abnormal -   [x] Mouth/Throat: Mucous membranes are moist.  The oropharynx is red but no exudate seen.     External Ears [x] Normal  [] Abnormal -    Neck: [x] No visualized mass [] Abnormal -     Pulmonary/Chest: [x] Respiratory effort normal   [x] No visualized signs of difficulty breathing or respiratory distress        [] Abnormal -      Musculoskeletal:   [x] Normal gait with no signs of ataxia         [x] Normal range of motion of neck        [] Abnormal -     Neurological:        [x] No Facial Asymmetry (Cranial nerve 7 motor function) (limited exam due to video visit)          [x] No gaze palsy        [] Abnormal -          Skin:        [x] No significant exanthematous lesions or discoloration noted on facial skin         [] Abnormal -            Psychiatric:       [x] Normal Affect [] Abnormal -        [x] No Hallucinations    Other pertinent observable physical exam findings:-          On this date 11/17/2021 I have spent 15 minutes reviewing previous notes, test results and face to face (virtual) with the patient discussing the diagnosis and importance of compliance with the treatment plan as well as documenting on the day of the visit. Tucker Mcmillan, was evaluated through a synchronous (real-time) audio-video encounter. The patient (or guardian if applicable) is aware that this is a billable service. Verbal consent to proceed has been obtained within the past 12 months. The visit was conducted pursuant to the emergency declaration under the [address] [place] [address], [address] [place] [address] authority and the [employer] Act. Patient identification was verified, and a caregiver was present when appropriate. The patient was located in a state where the provider was credentialed to provide care. An electronic signature was used to authenticate this note.     --Amy Martinez MD

## 2021-11-18 PROBLEM — J02.9 PHARYNGITIS: Status: ACTIVE | Noted: 2021-11-18

## 2021-11-18 ASSESSMENT — ENCOUNTER SYMPTOMS
TROUBLE SWALLOWING: 0
VOMITING: 0
SINUS PRESSURE: 0
COUGH: 0
CONSTIPATION: 0
ABDOMINAL DISTENTION: 0
SHORTNESS OF BREATH: 0
DIARRHEA: 0
ANAL BLEEDING: 0
NAUSEA: 0
ABDOMINAL PAIN: 0
VOICE CHANGE: 0
FACIAL SWELLING: 0
SINUS PAIN: 0
ALLERGIC/IMMUNOLOGIC NEGATIVE: 1
EYES NEGATIVE: 1
BLOOD IN STOOL: 0

## 2021-11-23 ENCOUNTER — VIRTUAL VISIT (OUTPATIENT)
Dept: INTERNAL MEDICINE CLINIC | Age: 36
End: 2021-11-23
Payer: COMMERCIAL

## 2021-11-23 DIAGNOSIS — G43.111 INTRACTABLE MIGRAINE WITH AURA WITH STATUS MIGRAINOSUS: Primary | ICD-10-CM

## 2021-11-23 PROCEDURE — 99213 OFFICE O/P EST LOW 20 MIN: CPT | Performed by: INTERNAL MEDICINE

## 2021-11-23 RX ORDER — RIMEGEPANT SULFATE 75 MG/75MG
1 TABLET, ORALLY DISINTEGRATING ORAL DAILY PRN
Qty: 8 TABLET | Refills: 5 | Status: SHIPPED | OUTPATIENT
Start: 2021-11-23

## 2021-11-23 ASSESSMENT — ENCOUNTER SYMPTOMS
DIARRHEA: 0
BLOOD IN STOOL: 0
SINUS PRESSURE: 0
ABDOMINAL DISTENTION: 0
TROUBLE SWALLOWING: 0
SORE THROAT: 1
NAUSEA: 0
EYE REDNESS: 0
VOMITING: 0
PHOTOPHOBIA: 1
ABDOMINAL PAIN: 0
FACIAL SWELLING: 0
CONSTIPATION: 0
SHORTNESS OF BREATH: 0
EYE PAIN: 0
VOICE CHANGE: 0
COUGH: 0
ALLERGIC/IMMUNOLOGIC NEGATIVE: 1
SINUS PAIN: 0
ANAL BLEEDING: 0
WHEEZING: 0

## 2021-11-23 NOTE — PROGRESS NOTES
Review of Systems   Constitutional: Negative. Negative for diaphoresis. HENT: Positive for sore throat. Negative for congestion, dental problem, drooling, ear discharge, ear pain, facial swelling, hearing loss, nosebleeds, sinus pressure, sinus pain, trouble swallowing and voice change. Hearing loss right ear. Eyes: Positive for photophobia. Negative for pain and redness. Eye exam 1.5 years ago. Respiratory: Negative for cough, shortness of breath and wheezing. Cardiovascular: Negative for chest pain and palpitations. Gastrointestinal: Negative for abdominal distention, abdominal pain, anal bleeding, blood in stool, constipation, diarrhea, nausea and vomiting. Pancreatic divisum  Past bout of severe pancreatitis, prior to gall bladder removal.   Endocrine: Negative. Negative for cold intolerance, heat intolerance, polydipsia, polyphagia and polyuria. Genitourinary: Negative. Maternal great aunt with breast cancer. Menarche age 13.   Uterine fibroid. Has pap appointment 21   Musculoskeletal: Negative. Skin: Negative for rash. Allergic/Immunologic: Negative. Neurological: Positive for headaches. Hemiplegic migraine   Hematological: Negative. Psychiatric/Behavioral: Negative. No flowsheet data found.      Physical Exam    [INSTRUCTIONS:  \"[x]\" Indicates a positive item  \"[]\" Indicates a negative item  -- DELETE ALL ITEMS NOT EXAMINED]    Constitutional: [x] Appears well-developed and well-nourished [x] No apparent distress      [] Abnormal -     Mental status: [x] Alert and awake  [x] Oriented to person/place/time [x] Able to follow commands    [] Abnormal -     Eyes:   EOM    [x]  Normal    [] Abnormal -   Sclera  [x]  Normal    [] Abnormal -          Discharge [x]  None visible   [] Abnormal -     HENT: [x] Normocephalic, atraumatic  [] Abnormal -   [x] Mouth/Throat: Mucous membranes are moist    External Ears [x] Normal  [] Abnormal -    Neck: [x] No visualized mass [] Abnormal -     Pulmonary/Chest: [x] Respiratory effort normal   [x] No visualized signs of difficulty breathing or respiratory distress        [] Abnormal -      Musculoskeletal:   [x] Normal gait with no signs of ataxia         [x] Normal range of motion of neck        [] Abnormal -     Neurological:        [x] No Facial Asymmetry (Cranial nerve 7 motor function) (limited exam due to video visit)          [x] No gaze palsy        [] Abnormal -          Skin:        [x] No significant exanthematous lesions or discoloration noted on facial skin         [] Abnormal -            Psychiatric:       [x] Normal Affect [] Abnormal -        [x] No Hallucinations    Other pertinent observable physical exam findings:-          On this date 11/23/2021 I have spent 20 minutes reviewing previous notes, test results and face to face (virtual) with the patient discussing the diagnosis and importance of compliance with the treatment plan as well as documenting on the day of the visitDarren Zaidasundeep Schwab was evaluated through a synchronous (real-time) audio-video encounter. The patient (or guardian if applicable) is aware that this is a billable service. Verbal consent to proceed has been obtained within the past 12 months. The visit was conducted pursuant to the emergency declaration under the [facility], [address] [place] [address] authority and the [employer] General Act. Patient identification was verified, and a caregiver was present when appropriate. The patient was located in a state where the provider was credentialed to provide care. An electronic signature was used to authenticate this note.     --Wan Shannon MD

## 2021-12-01 ENCOUNTER — VIRTUAL VISIT (OUTPATIENT)
Dept: INTERNAL MEDICINE CLINIC | Age: 36
End: 2021-12-01
Payer: COMMERCIAL

## 2021-12-01 ENCOUNTER — TELEPHONE (OUTPATIENT)
Dept: INTERNAL MEDICINE CLINIC | Age: 36
End: 2021-12-01

## 2021-12-01 DIAGNOSIS — G43.101 MIGRAINE WITH AURA AND WITH STATUS MIGRAINOSUS, NOT INTRACTABLE: Primary | ICD-10-CM

## 2021-12-01 PROCEDURE — 99213 OFFICE O/P EST LOW 20 MIN: CPT | Performed by: INTERNAL MEDICINE

## 2021-12-01 ASSESSMENT — ENCOUNTER SYMPTOMS
CONSTIPATION: 0
NAUSEA: 0
COUGH: 0
ABDOMINAL DISTENTION: 0
DIARRHEA: 0
FACIAL SWELLING: 0
ABDOMINAL PAIN: 0
SHORTNESS OF BREATH: 0
VOMITING: 0
PHOTOPHOBIA: 1
BLURRED VISION: 1
EYE REDNESS: 0
SINUS PAIN: 0
WHEEZING: 0
SINUS PRESSURE: 0
EYE PAIN: 0
TROUBLE SWALLOWING: 0
ALLERGIC/IMMUNOLOGIC NEGATIVE: 1
EYE WATERING: 0
BLOOD IN STOOL: 0
ANAL BLEEDING: 0
SORE THROAT: 0
VOICE CHANGE: 0

## 2021-12-01 NOTE — TELEPHONE ENCOUNTER
She would also like to talk to [address] [place] [address] about this and other things so she has a VV with Dr Kavin Benítez at [phone]

## 2021-12-01 NOTE — TELEPHONE ENCOUNTER
----- Message from Merlene Grullon sent at 12/1/2021 10:46 AM EST -----  Subject: Medication Problem    QUESTIONS  Name of Medication? Rimegepant Sulfate (NURTEC) 75 MG TBDP  Patient-reported dosage and instructions? patient hasnt picked up yet  What question or problem do you have with the medication? Patient is off   work again due to migraine. She is waiting for the prior authorization for   medication. Can you please check on the status with [employer], ASAP. Please   contact patient to advise, with info. Preferred Pharmacy? [id number] [place], [place] - [address] [place] [phone] [address] [phone]  Pharmacy phone number (if available)? [phone]  Additional Information for Provider?   ---------------------------------------------------------------------------  --------------  CALL BACK INFO  What is the best way for the office to contact you? OK to leave message on   voicemail  Preferred Call Back Phone Number? [phone]  ---------------------------------------------------------------------------  --------------  SCRIPT ANSWERS  Relationship to Patient?  Self

## 2021-12-01 NOTE — PROGRESS NOTES
Zaida Schwab (:  1985) is a 43 y.o. female,Established patient, here for evaluation of the following chief complaint(s): No chief complaint on file. ASSESSMENT/PLAN:  1. Migraine with aura and with status migrainosus, not intractable migraines have been more frequent and Nurtec 75 mg every other day ordered but awaiting approval.  Will increase verapamil from 120 to 180 mg daily. While awaiting approval on Nurtec will give Fioricet as needed migraine unrelieved with Excedrin Migraine. Triptans are not an option with history of ischemic migraine.  -     verapamil (CALAN SR) 180 MG extended release tablet; Take 1 tablet by mouth daily, Disp-30 tablet, R-5Normal  -     butalbital-acetaminophen-caffeine (FIORICET, ESGIC) -40 MG per tablet; Take 1 tablet by mouth every 6 hours as needed for Headaches, Disp-30 tablet, R-3Normal  -     [facility]FL - Hal Turner MD, Neurology, [facility]      Return in about 4 weeks (around 2021). SUBJECTIVE/OBJECTIVE:  Migraine   The problem occurs constantly (patient took excedrine migraine which lessened the headache but did not resolve it.). The quality of the pain is described as throbbing and shooting. The pain is at a severity of 8/10. The pain is severe. Associated symptoms include blurred vision, phonophobia and photophobia. Pertinent negatives include no abdominal pain, coughing, dizziness, ear pain, eye pain, eye redness, eye watering, hearing loss, nausea, sinus pressure, sore throat or vomiting. Associated symptoms comments: Tingly on the left side of face. Patient had the aura prior to headache. . The symptoms are aggravated by weather changes and work (Work as short staff and very stressful now). Treatments tried: Excedrin Migraine gives some relief, cannot take triptans because of hemiparesis with migraines in the past. The treatment provided mild relief. Her past medical history is significant for migraine headaches.  There is no history of cancer, recent head traumas or sinus disease. 132/84  Pulse of 80  Review of Systems   Constitutional: Negative. Negative for diaphoresis. HENT: Negative for congestion, dental problem, drooling, ear discharge, ear pain, facial swelling, hearing loss, nosebleeds, sinus pressure, sinus pain, sore throat, trouble swallowing and voice change. Hearing loss right ear. Eyes: Positive for blurred vision and photophobia. Negative for pain and redness. Eye exam 1.5 years ago. Respiratory: Negative for cough, shortness of breath and wheezing. Cardiovascular: Negative for chest pain and palpitations. Gastrointestinal: Negative for abdominal distention, abdominal pain, anal bleeding, blood in stool, constipation, diarrhea, nausea and vomiting. Pancreatic divisum  Past bout of severe pancreatitis, prior to gall bladder removal.   Endocrine: Negative. Negative for cold intolerance, heat intolerance, polydipsia, polyphagia and polyuria. Genitourinary: Negative. Maternal great aunt with breast cancer. Menarche age 13.   Uterine fibroid. Has pap appointment 21   Musculoskeletal: Negative. Skin: Negative for rash. Allergic/Immunologic: Negative. Neurological: Positive for headaches. Negative for dizziness. Hemiplegic migraine   Hematological: Negative. Psychiatric/Behavioral: Negative. No flowsheet data found.      Physical Exam    [INSTRUCTIONS:  \"[x]\" Indicates a positive item  \"[]\" Indicates a negative item  -- DELETE ALL ITEMS NOT EXAMINED]    Constitutional: [x] Appears well-developed and well-nourished [x] No apparent distress      [] Abnormal -     Mental status: [x] Alert and awake  [x] Oriented to person/place/time [x] Able to follow commands    [] Abnormal -     Eyes:   EOM    [x]  Normal    [] Abnormal -   Sclera  [x]  Normal    [] Abnormal -          Discharge [x]  None visible   [] Abnormal -     HENT: [x] Normocephalic, atraumatic  [] Abnormal -   [x] Mouth/Throat: Mucous membranes are moist    External Ears [x] Normal  [] Abnormal -    Neck: [x] No visualized mass [] Abnormal -     Pulmonary/Chest: [x] Respiratory effort normal   [x] No visualized signs of difficulty breathing or respiratory distress        [] Abnormal -      Musculoskeletal:   [x] Normal gait with no signs of ataxia         [x] Normal range of motion of neck        [] Abnormal -     Neurological:        [x] No Facial Asymmetry (Cranial nerve 7 motor function) (limited exam due to video visit)          [x] No gaze palsy        [] Abnormal -          Skin:        [x] No significant exanthematous lesions or discoloration noted on facial skin         [] Abnormal -            Psychiatric:       [x] Normal Affect [] Abnormal -        [x] No Hallucinations    Other pertinent observable physical exam findings:-    Note for today and tomorrow. Nando Santoro, was evaluated through a synchronous (real-time) audio-video encounter. The patient (or guardian if applicable) is aware that this is a billable service. Verbal consent to proceed has been obtained within the past 12 months. The visit was conducted pursuant to the emergency declaration under the [facility], [address] [place] [address] authority and the [employer] General Act. Patient identification was verified, and a caregiver was present when appropriate. The patient was located in a state where the provider was credentialed to provide care. An electronic signature was used to authenticate this note.     --Jose Fontenot MD

## 2021-12-02 RX ORDER — BUTALBITAL, ACETAMINOPHEN AND CAFFEINE 50; 325; 40 MG/1; MG/1; MG/1
1 TABLET ORAL EVERY 6 HOURS PRN
Qty: 30 TABLET | Refills: 3 | Status: SHIPPED | OUTPATIENT
Start: 2021-12-02 | End: 2022-03-04

## 2021-12-06 ENCOUNTER — TELEPHONE (OUTPATIENT)
Dept: INTERNAL MEDICINE CLINIC | Age: 36
End: 2021-12-06

## 2021-12-06 PROBLEM — G43.119 INTRACTABLE MIGRAINE WITH AURA WITHOUT STATUS MIGRAINOSUS: Status: ACTIVE | Noted: 2021-12-06

## 2021-12-06 NOTE — TELEPHONE ENCOUNTER
----- Message from Suni Issa sent at 12/6/2021  9:06 AM EST -----  Subject: Message to Provider    QUESTIONS  Information for Provider? pt needs a return back to work note   ---------------------------------------------------------------------------  --------------  [address]  What is the best way for the office to contact you? OK to leave message on   voicemail  Preferred Call Back Phone Number? [phone]  ---------------------------------------------------------------------------  --------------  SCRIPT ANSWERS  Relationship to Patient?  Self

## 2021-12-08 ENCOUNTER — TELEPHONE (OUTPATIENT)
Dept: INTERNAL MEDICINE CLINIC | Age: 36
End: 2021-12-08

## 2021-12-08 DIAGNOSIS — R10.13 EPIGASTRIC PAIN: ICD-10-CM

## 2021-12-08 DIAGNOSIS — R10.13 EPIGASTRIC PAIN: Primary | ICD-10-CM

## 2021-12-08 DIAGNOSIS — R11.10 INTRACTABLE VOMITING, PRESENCE OF NAUSEA NOT SPECIFIED, UNSPECIFIED VOMITING TYPE: ICD-10-CM

## 2021-12-08 LAB
ALBUMIN SERPL-MCNC: 4.4 G/DL (ref 3.4–5)
ALP BLD-CCNC: 66 U/L (ref 40–129)
ALT SERPL-CCNC: 12 U/L (ref 10–40)
ANION GAP SERPL CALCULATED.3IONS-SCNC: 14 MMOL/L (ref 3–16)
AST SERPL-CCNC: 19 U/L (ref 15–37)
BACTERIA: ABNORMAL /HPF
BASOPHILS ABSOLUTE: 0 K/UL (ref 0–0.2)
BASOPHILS RELATIVE PERCENT: 0.5 %
BILIRUB SERPL-MCNC: <0.2 MG/DL (ref 0–1)
BILIRUBIN DIRECT: <0.2 MG/DL (ref 0–0.3)
BILIRUBIN URINE: NEGATIVE
BILIRUBIN, INDIRECT: NORMAL MG/DL (ref 0–1)
BLOOD, URINE: NEGATIVE
BUN BLDV-MCNC: 5 MG/DL (ref 7–20)
CALCIUM SERPL-MCNC: 9.5 MG/DL (ref 8.3–10.6)
CHLORIDE BLD-SCNC: 101 MMOL/L (ref 99–110)
CLARITY: CLEAR
CO2: 24 MMOL/L (ref 21–32)
COLOR: YELLOW
CREAT SERPL-MCNC: 0.7 MG/DL (ref 0.6–1.1)
EOSINOPHILS ABSOLUTE: 0.3 K/UL (ref 0–0.6)
EOSINOPHILS RELATIVE PERCENT: 4.2 %
EPITHELIAL CELLS, UA: 9 /HPF (ref 0–5)
GFR AFRICAN AMERICAN: >60
GFR NON-AFRICAN AMERICAN: >60
GLUCOSE BLD-MCNC: 83 MG/DL (ref 70–99)
GLUCOSE URINE: NEGATIVE MG/DL
HCT VFR BLD CALC: 36.7 % (ref 36–48)
HEMOGLOBIN: 11.4 G/DL (ref 12–16)
HYALINE CASTS: 3 /LPF (ref 0–8)
KETONES, URINE: NEGATIVE MG/DL
LEUKOCYTE ESTERASE, URINE: ABNORMAL
LIPASE: 60 U/L (ref 13–60)
LYMPHOCYTES ABSOLUTE: 2.2 K/UL (ref 1–5.1)
LYMPHOCYTES RELATIVE PERCENT: 34.2 %
MCH RBC QN AUTO: 22.9 PG (ref 26–34)
MCHC RBC AUTO-ENTMCNC: 30.9 G/DL (ref 31–36)
MCV RBC AUTO: 74 FL (ref 80–100)
MICROSCOPIC EXAMINATION: YES
MONOCYTES ABSOLUTE: 0.5 K/UL (ref 0–1.3)
MONOCYTES RELATIVE PERCENT: 8.1 %
NEUTROPHILS ABSOLUTE: 3.3 K/UL (ref 1.7–7.7)
NEUTROPHILS RELATIVE PERCENT: 53 %
NITRITE, URINE: NEGATIVE
PDW BLD-RTO: 18.1 % (ref 12.4–15.4)
PH UA: 6 (ref 5–8)
PHOSPHORUS: 4.2 MG/DL (ref 2.5–4.9)
PLATELET # BLD: 304 K/UL (ref 135–450)
PMV BLD AUTO: 8.5 FL (ref 5–10.5)
POTASSIUM SERPL-SCNC: 3.8 MMOL/L (ref 3.5–5.1)
PROTEIN UA: NEGATIVE MG/DL
RBC # BLD: 4.97 M/UL (ref 4–5.2)
RBC UA: 2 /HPF (ref 0–4)
SODIUM BLD-SCNC: 139 MMOL/L (ref 136–145)
SPECIFIC GRAVITY UA: 1.01 (ref 1–1.03)
TOTAL PROTEIN: 7.5 G/DL (ref 6.4–8.2)
URINE TYPE: ABNORMAL
UROBILINOGEN, URINE: 0.2 E.U./DL
WBC # BLD: 6.3 K/UL (ref 4–11)
WBC UA: 9 /HPF (ref 0–5)

## 2021-12-08 NOTE — TELEPHONE ENCOUNTER
Pt called. She said she has vomited six times since 4:30 am and her stomach burns. No other sx. Please call pt to advise. Phone [phone].

## 2021-12-08 NOTE — TELEPHONE ENCOUNTER
Progress Notes  Merline Keel, MD (Physician)   Internal Medicine  Patient awakened at 3 AM with nausea vomiting epigastric pain and vomited 7 times. She had her gallbladder removed years ago. History of pancreatitis and needs avoid oily food. Patient did have a breakfast sandwich for dinner and lunch     3 restaurant earlier that day. The vomiting has and epigastric pain has decreased. No complaint of shortness of breath. Patient vomited undigested food with no coffee-ground. No complaint of rectal bleeding. Patient is drinking Gatorade now and able to keep it down. Not feeling good. Will go to outpatient lab to have CBC, renal, lipase, hepatic profileand urinalysis.

## 2021-12-08 NOTE — PROGRESS NOTES
Patient awakened at 3 AM with nausea vomiting epigastric pain and vomited 7 times. She had her gallbladder removed years ago. History of pancreatitis and needs avoid oily food. Patient did have a breakfast sandwich for dinner and lunch    3 restaurant earlier that day. The vomiting has and epigastric pain has decreased. No complaint of shortness of breath. Patient vomited undigested food with no coffee-ground. No complaint of rectal bleeding. Patient is drinking Gatorade now and able to keep it down. Not feeling good. Will go to outpatient lab to have CBC, renal, lipase, hepatic profileand urinalysis.

## 2021-12-09 DIAGNOSIS — D50.9 MICROCYTIC HYPOCHROMIC ANEMIA: ICD-10-CM

## 2021-12-09 DIAGNOSIS — R10.13 EPIGASTRIC PAIN: Primary | ICD-10-CM

## 2021-12-10 ENCOUNTER — TELEPHONE (OUTPATIENT)
Dept: INTERNAL MEDICINE CLINIC | Age: 36
End: 2021-12-10

## 2021-12-10 NOTE — TELEPHONE ENCOUNTER
Okay to write the note for that day. Make sure patient reads my chart message that I placed for the results of the recent blood work done for nausea and vomiting.

## 2021-12-10 NOTE — TELEPHONE ENCOUNTER
Pt said she missed work on 12-8-21 because she was vomiting. Dr. Sly Alfredo ordered a Urinalysis & labs and patient went to a lab by [facility] to have them done. Patient needs a doctors excuse for that date. Call pt when note is ready and she will pick it up.

## 2022-01-05 DIAGNOSIS — G43.101 MIGRAINE WITH AURA AND WITH STATUS MIGRAINOSUS, NOT INTRACTABLE: ICD-10-CM

## 2022-03-04 DIAGNOSIS — G43.101 MIGRAINE WITH AURA AND WITH STATUS MIGRAINOSUS, NOT INTRACTABLE: ICD-10-CM

## 2022-03-06 RX ORDER — BUTALBITAL, ACETAMINOPHEN AND CAFFEINE 50; 325; 40 MG/1; MG/1; MG/1
1 TABLET ORAL EVERY 6 HOURS PRN
Qty: 30 TABLET | Refills: 0 | Status: SHIPPED | OUTPATIENT
Start: 2022-03-06

## 2022-03-08 DIAGNOSIS — G43.101 MIGRAINE WITH AURA AND WITH STATUS MIGRAINOSUS, NOT INTRACTABLE: ICD-10-CM

## 2022-03-09 RX ORDER — BUTALBITAL, ACETAMINOPHEN AND CAFFEINE 50; 325; 40 MG/1; MG/1; MG/1
1 TABLET ORAL EVERY 6 HOURS PRN
Qty: 30 TABLET | OUTPATIENT
Start: 2022-03-09

## 2024-01-01 NOTE — ED PROVIDER NOTES
[facility]          NURSE PRACTITIONER NOTE       Date of evaluation: 8/10/2021    Chief Complaint     Arm Pain (right arm swelling and pain, had CT scan yesterday with contrast. IV infiltrated during injection)      History of Present Illness     Aneudy Conti is a 38 y.o. female who presents to the emergency department with a complaint of ongoing swelling to her right arm/forearm. Patient was evaluated in the emergency room yesterday, when she had IV dye infiltrated into her right upper extremity from an IV placed in her right wrist.  Was given instructions on intermittent ice and elevation, states that initially her swelling seemed improved, however throughout the day seem to progressively worsen. She does feel that her range of motion and movement of her fingers has improved however was concerned about the swelling. Denies associated fevers chills or sweats. Review of Systems     Review of Systems   Constitutional: Negative. Respiratory: Negative. Cardiovascular: Negative. Musculoskeletal:        Right arm swelling and pain   Skin: Negative. Allergic/Immunologic: Negative for immunocompromised state. Hematological: Does not bruise/bleed easily. Psychiatric/Behavioral: Negative. Past Medical, Surgical, Family, and Social History     She has a past medical history of Headache. She has no past surgical history on file. Her family history is not on file. She reports that she has never smoked. She has never used smokeless tobacco. She reports previous alcohol use. She reports that she does not use drugs. Medications     Discharge Medication List as of 8/10/2021  9:39 PM      CONTINUE these medications which have NOT CHANGED    Details   verapamil (VERELAN) 120 MG extended release capsule Historical Med             Allergies     She is allergic to other, guaifenesin, and morphine.     Physical Exam     INITIAL VITALS: BP: (!) 118/58, Temp: 98.6 °F (37 °C), Pulse: 86, Resp: 18, SpO2: 100 %   Physical Exam  Vitals and nursing note reviewed. Constitutional:       Appearance: Normal appearance. Cardiovascular:      Rate and Rhythm: Normal rate. Pulmonary:      Effort: Pulmonary effort is normal. No respiratory distress. Musculoskeletal:      Comments: Edema noted of the right hand, with extension up the forearm. Compartments soft throughout with full ROM noted of the wrist, hand and fingers, intact distal pulses and intact sensation. Skin:     General: Skin is warm and dry. Neurological:      Mental Status: She is alert and oriented to person, place, and time. Psychiatric:         Mood and Affect: Mood normal.         Behavior: Behavior normal.           Diagnostic Results       RADIOLOGY:  XR RADIUS ULNA RIGHT (2 VIEWS)   Final Result      1.  Negative           LABS:   Results for orders placed or performed during the hospital encounter of 08/10/21   CBC auto differential   Result Value Ref Range    WBC 8.4 4.0 - 11.0 K/uL    RBC 4.40 4.00 - 5.20 M/uL    Hemoglobin 11.5 (L) 12.0 - 16.0 g/dL    Hematocrit 34.0 (L) 36.0 - 48.0 %    MCV 77.3 (L) 80.0 - 100.0 fL    MCH 26.1 26.0 - 34.0 pg    MCHC 33.8 31.0 - 36.0 g/dL    RDW 15.8 (H) 12.4 - 15.4 %    Platelets 950 221 - 989 K/uL    MPV 8.0 5.0 - 10.5 fL    Neutrophils % 57.4 %    Lymphocytes % 28.4 %    Monocytes % 8.5 %    Eosinophils % 4.9 %    Basophils % 0.8 %    Neutrophils Absolute 4.8 1.7 - 7.7 K/uL    Lymphocytes Absolute 2.4 1.0 - 5.1 K/uL    Monocytes Absolute 0.7 0.0 - 1.3 K/uL    Eosinophils Absolute 0.4 0.0 - 0.6 K/uL    Basophils Absolute 0.1 0.0 - 0.2 K/uL   Basic Metabolic Panel w/ Reflex to MG   Result Value Ref Range    Sodium 135 (L) 136 - 145 mmol/L    Potassium reflex Magnesium 3.6 3.5 - 5.1 mmol/L    Chloride 97 (L) 99 - 110 mmol/L    CO2 28 21 - 32 mmol/L    Anion Gap 10 3 - 16    Glucose 97 70 - 99 mg/dL    BUN 10 7 - 20 mg/dL    CREATININE 0.9 0.6 - 1.1 mg/dL    GFR Non- >60 >60    GFR African American >60 >60    Calcium 9.5 8.3 - 10.6 mg/dL       RECENT VITALS:  BP: 128/80, Temp: 98.6 °F (37 °C), Pulse: 82, Resp: 16, SpO2: 100 %     ED Course     Nursing Notes, Past Medical Hx, Past Surgical Hx, Social Hx, Allergies, and Family Hx were reviewed. The patient was given the following medications:  Orders Placed This Encounter   Medications    doxycycline monohydrate (ADOXA) 100 MG tablet     Sig: Take 1 tablet by mouth 2 times daily for 10 days     Dispense:  20 tablet     Refill:  0            CONSULTS:  None    MEDICAL Eva Andersen / HENRIQUE / Hermanmeviola Florin is a 38 y.o. female who presents with complaints as noted in HPI. Patient presents for reevaluation of right upper extremity edema status post IVP dye infiltration yesterday. On presentation she does have some edema noted to her right dorsal hand with extension of her forearm to below her elbow; full range of motion noted, and vascularly intact. X-ray of the right arm shows no evidence of IV contrast.  Given soft compartments, and initial reduction in edema when she was elevating her extremity; I do feel she stable for discharge home. Patient will be encouraged to elevate this evening, as well as intermittently ice her arm. She was provided with a prescription for doxycycline for the possibility of developing cellulitis given very mild erythema noted to her dorsal hand; she will start doxycycline if her edema does not improve with elevation. Given strict return precautions which she verbalized understanding of. Patient was given strict return precautions as outlined in the AVS. Patient was agreeable and understanding to this plan of care. This patient was also evaluated by the attending physician. All care plans were discussed and agreed upon. Clinical Impression     1.  Swelling of right upper extremity        Disposition     PATIENT REFERRED TO:  [facility] [address]  [facility]  [address] [place] [address] [address] [id number]    call for primary care followup/to establish care      DISCHARGE MEDICATIONS:  Discharge Medication List as of 8/10/2021  9:39 PM      START taking these medications    Details   doxycycline monohydrate (ADOXA) 100 MG tablet Take 1 tablet by mouth 2 times daily for 10 days, Disp-20 tablet, R-0Print             DISPOSITION Decision To Discharge 08/10/2021 09:17:07 PM        RAMON Adan - CNP  08/11/21 0000 Statement Selected